# Patient Record
Sex: FEMALE | Race: WHITE | Employment: OTHER | URBAN - METROPOLITAN AREA
[De-identification: names, ages, dates, MRNs, and addresses within clinical notes are randomized per-mention and may not be internally consistent; named-entity substitution may affect disease eponyms.]

---

## 2022-03-19 PROBLEM — R53.82 CHRONIC FATIGUE: Status: ACTIVE | Noted: 2019-04-29

## 2022-03-19 PROBLEM — R06.02 SOB (SHORTNESS OF BREATH): Status: ACTIVE | Noted: 2019-03-19

## 2022-03-20 PROBLEM — J06.9 UPPER RESPIRATORY TRACT INFECTION: Status: ACTIVE | Noted: 2018-11-26

## 2022-03-24 PROBLEM — G43.109 MIGRAINE WITH VERTIGO: Status: ACTIVE | Noted: 2022-03-21

## 2022-04-18 LAB
AVERAGE GLUCOSE: NORMAL
HBA1C MFR BLD: 5.9 %

## 2022-08-19 LAB
AVERAGE GLUCOSE: NORMAL
HBA1C MFR BLD: 5.9 %

## 2022-09-26 ENCOUNTER — OFFICE VISIT (OUTPATIENT)
Dept: CARDIOLOGY CLINIC | Age: 77
End: 2022-09-26
Payer: COMMERCIAL

## 2022-09-26 VITALS
HEART RATE: 70 BPM | HEIGHT: 64 IN | DIASTOLIC BLOOD PRESSURE: 68 MMHG | SYSTOLIC BLOOD PRESSURE: 124 MMHG | BODY MASS INDEX: 31.92 KG/M2 | WEIGHT: 187 LBS

## 2022-09-26 DIAGNOSIS — I10 ESSENTIAL HYPERTENSION: ICD-10-CM

## 2022-09-26 DIAGNOSIS — I48.21 PERMANENT ATRIAL FIBRILLATION (HCC): Primary | ICD-10-CM

## 2022-09-26 PROCEDURE — 1123F ACP DISCUSS/DSCN MKR DOCD: CPT | Performed by: INTERNAL MEDICINE

## 2022-09-26 PROCEDURE — 99214 OFFICE O/P EST MOD 30 MIN: CPT | Performed by: INTERNAL MEDICINE

## 2022-09-26 RX ORDER — NEBIVOLOL 20 MG/1
20 TABLET ORAL DAILY
Qty: 30 TABLET | Refills: 12 | Status: SHIPPED | OUTPATIENT
Start: 2022-09-26 | End: 2022-09-27 | Stop reason: SDUPTHER

## 2022-09-26 RX ORDER — CETIRIZINE HYDROCHLORIDE 10 MG/1
10 TABLET ORAL DAILY
COMMUNITY

## 2022-09-26 RX ORDER — PHENOL 1.4 %
1 AEROSOL, SPRAY (ML) MUCOUS MEMBRANE DAILY
COMMUNITY

## 2022-09-26 RX ORDER — HYDROCHLOROTHIAZIDE 12.5 MG/1
12.5 TABLET ORAL DAILY
Qty: 30 TABLET | Refills: 12 | Status: SHIPPED | OUTPATIENT
Start: 2022-09-26 | End: 2022-09-27 | Stop reason: SDUPTHER

## 2022-09-26 ASSESSMENT — ENCOUNTER SYMPTOMS
DIARRHEA: 0
HOARSE VOICE: 0
HEMATOCHEZIA: 0
COLOR CHANGE: 0
WHEEZING: 0
BLURRED VISION: 0
SHORTNESS OF BREATH: 0
SPUTUM PRODUCTION: 0
HEMATEMESIS: 0
BOWEL INCONTINENCE: 0
ABDOMINAL PAIN: 0
ORTHOPNEA: 0

## 2022-09-26 NOTE — PROGRESS NOTES
[address], [address]  PHONE: [phone]        22        NAME:  Teodoro Espino  : 1945  MRN: [id number]       SUBJECTIVE:   Teodoro Espino is a 76 y.o. female seen for a follow up visit regarding the following: The patient has a hx of permanent AF and primary hypertension. She returns for scheduled follow up. Overall,she reports doing ok. Chief Complaint   Patient presents with    Atrial Fibrillation    Hypertension    Hyperlipidemia     6 month f/u       HPI:    Atrial Fibrillation  Presents for follow-up visit. Symptoms are negative for an AICD problem, bradycardia, chest pain, dizziness, hypertension, hypotension, pacemaker problem, palpitations, shortness of breath, syncope and weakness. Past Medical History, Past Surgical History, Family history, Social History, and Medications were all reviewed with the patient today and updated as necessary.          Current Outpatient Medications:     cetirizine (ZYRTEC) 10 MG tablet, Take 10 mg by mouth daily, Disp: , Rfl:     calcium carbonate 600 MG TABS tablet, Take 1 tablet by mouth daily, Disp: , Rfl:     hydroCHLOROthiazide (HYDRODIURIL) 12.5 MG tablet, Take 1 tablet by mouth daily, Disp: 30 tablet, Rfl: 12    nebivolol (BYSTOLIC) 20 MG TABS tablet, Take 1 tablet by mouth daily Take 1 tablet by mouth once daily, Disp: 30 tablet, Rfl: 12    rivaroxaban (XARELTO) 15 MG TABS tablet, Take 1 tablet by mouth daily, Disp: 30 tablet, Rfl: 12    acetaminophen (TYLENOL) 500 MG tablet, Take 500 mg by mouth every 6 hours as needed, Disp: , Rfl:     amLODIPine (NORVASC) 5 MG tablet, Take 5 mg by mouth daily, Disp: , Rfl:     atorvastatin (LIPITOR) 40 MG tablet, Take 40 mg by mouth daily, Disp: , Rfl:     clotrimazole-betamethasone (LOTRISONE) 1-0.05 % cream, Apply topically 2 times daily, Disp: , Rfl:     fluocinolone (DERMOTIC) 0.01 % OIL oil, Place in ear(s), Disp: , Rfl:     furosemide (LASIX) 40 MG tablet, Take 40 mg by mouth daily as needed, Disp: , Rfl:     levothyroxine (SYNTHROID) 100 MCG tablet, Take 88 mcg by mouth every morning (before breakfast), Disp: , Rfl:     nitroGLYCERIN (NITROSTAT) 0.4 MG SL tablet, Place 0.4 mg under the tongue, Disp: , Rfl:     omeprazole (PRILOSEC) 20 MG delayed release capsule, Take 20 mg by mouth daily, Disp: , Rfl:     polyethyl glycol-propyl glycol 0.4-0.3 % (SYSTANE) 0.4-0.3 % ophthalmic solution, as needed, Disp: , Rfl:     potassium chloride (KLOR-CON) 10 MEQ extended release tablet, Take 10 mEq by mouth daily, Disp: , Rfl:     hypromellose (ISOPTO TEARS) 0.5 % ophthalmic solution, Apply 1 drop to eye as needed (Patient not taking: Reported on 9/26/2022), Disp: , Rfl:   Allergies   Allergen Reactions    Sulfamethoxazole-Trimethoprim Other (See Comments)    Amoxicillin Rash     Past Medical History:   Diagnosis Date    Arthritis     Asthma     when exposed to smoke she wheezes    Chest pain     Disorder of thyroid 11/20/2015    Essential hypertension 11/20/2015    GERD (gastroesophageal reflux disease)     Hypercholesterolemia     Hyperlipidemia 11/20/2015    Hypothyroidism     Permanent atrial fibrillation ([facility] [address] 11/20/2015    Syncope      Past Surgical History:   Procedure Laterality Date    APPENDECTOMY      BLADDER SUSPENSION      bladder tack    CARDIAC CATHETERIZATION Left 04/15/2011    Normal coronary arteries. Normal LV function    HYSTERECTOMY (CERVIX STATUS UNKNOWN)      OTHER SURGICAL HISTORY      thyroid replacement    TOTAL KNEE ARTHROPLASTY Bilateral      Family History   Problem Relation Age of Onset    Coronary Art Dis Mother         Bypass surgery    Dementia Father     Other Mother         abdominal aortic aneurysms    Other Father         abdominal aortic aneurysms    Coronary Art Dis Father         bypass surgery twice      Social History     Tobacco Use    Smoking status: Never    Smokeless tobacco: Never   Substance Use Topics    Alcohol use:  No ROS:    Review of Systems   Constitutional: Negative for chills, decreased appetite, diaphoresis, fever and malaise/fatigue. HENT:  Negative for congestion, hearing loss, hoarse voice and nosebleeds. Eyes:  Negative for blurred vision. Cardiovascular:  Negative for chest pain, claudication, cyanosis, dyspnea on exertion, irregular heartbeat, leg swelling, near-syncope, orthopnea, palpitations, paroxysmal nocturnal dyspnea and syncope. Respiratory:  Negative for shortness of breath, sputum production and wheezing. Endocrine: Negative for polydipsia, polyphagia and polyuria. Skin:  Negative for color change. Gastrointestinal:  Negative for abdominal pain, bowel incontinence, diarrhea, hematemesis and hematochezia. Genitourinary:  Negative for dysuria, frequency and hematuria. Neurological:  Negative for dizziness, focal weakness, light-headedness, loss of balance, numbness, sensory change and weakness. Psychiatric/Behavioral:  Negative for altered mental status and memory loss. PHYSICAL EXAM:   /68   Pulse 70   Ht 5' 4\" (1.626 m)   Wt 187 lb (84.8 kg)   BMI 32.10 kg/m²      Physical Exam  Constitutional:       Appearance: Normal appearance. HENT:      Head: Normocephalic and atraumatic. Nose: Nose normal.   Eyes:      Extraocular Movements: Extraocular movements intact. Pupils: Pupils are equal, round, and reactive to light. Neck:      Vascular: No carotid bruit. Cardiovascular:      Rate and Rhythm: Rhythm irregular. Pulses: Normal pulses. Heart sounds: No murmur heard. Pulmonary:      Effort: Pulmonary effort is normal.      Breath sounds: Normal breath sounds. Abdominal:      General: Abdomen is flat. Bowel sounds are normal.      Palpations: Abdomen is soft. Musculoskeletal:         General: Normal range of motion. Cervical back: Normal range of motion and neck supple. Skin:     General: Skin is warm and dry.    Neurological: General: No focal deficit present. Mental Status: She is alert and oriented to person, place, and time. Psychiatric:         Mood and Affect: Mood normal.       Medical problems and test results were reviewed with the patient today. ASSESSMENT and PLAN    Yuki Del Cid was seen today for atrial fibrillation, hypertension and hyperlipidemia. Diagnoses and all orders for this visit:    Permanent atrial fibrillation (HCC):Stable. Continue Bystolic and Xarelto. .  -     rivaroxaban (XARELTO) 15 MG TABS tablet; Take 1 tablet by mouth daily    Essential hypertension:Stable. Continue Bystolic ,Norvasc,and HCTZ. -     hydroCHLOROthiazide (HYDRODIURIL) 12.5 MG tablet; Take 1 tablet by mouth daily  -     nebivolol (BYSTOLIC) 20 MG TABS tablet; Take 1 tablet by mouth daily Take 1 tablet by mouth once daily        Disposition:    Return in about 6 months (around 3/26/2023).                 Kendra Hawley MD  9/26/2022  1:15 PM

## 2022-09-27 DIAGNOSIS — I10 ESSENTIAL HYPERTENSION: ICD-10-CM

## 2022-09-27 DIAGNOSIS — I48.21 PERMANENT ATRIAL FIBRILLATION (HCC): ICD-10-CM

## 2022-09-27 RX ORDER — NEBIVOLOL 20 MG/1
20 TABLET ORAL DAILY
Qty: 90 TABLET | Refills: 3 | Status: SHIPPED | OUTPATIENT
Start: 2022-09-27

## 2022-09-27 RX ORDER — HYDROCHLOROTHIAZIDE 12.5 MG/1
12.5 TABLET ORAL DAILY
Qty: 90 TABLET | Refills: 3 | Status: SHIPPED | OUTPATIENT
Start: 2022-09-27

## 2022-09-27 NOTE — TELEPHONE ENCOUNTER
Patient states that she was seen in the office yesterday and told the nurse that she needed refills but NOTHING was called in and now she is completley out!!! Patient was upset that these were not called in. She needs amlodipine , Xarelto and Hydrochlorothiazide . Please call these in today to [facility] in [facility]. Patient also wants nurse to call her when called in.

## 2022-09-27 NOTE — TELEPHONE ENCOUNTER
Returned patient's call, informing her that her prescriptions were printed yesterday instead of being sent electronically. Informed that I have pended the RXs & sent them to Dr. Bethanne Cabot for approval. She verbalized understanding & thanked.  //pg

## 2022-10-04 NOTE — TELEPHONE ENCOUNTER
Pt states that she did not get the refill on Amlodipine 5 mg that was supposed to be sent to [address] [facility] in [facility]. She states that she only has one more pill left. Please call pt with any questions. Thank you.

## 2022-10-07 RX ORDER — AMLODIPINE BESYLATE 5 MG/1
5 TABLET ORAL DAILY
Qty: 90 TABLET | Refills: 3 | Status: SHIPPED | OUTPATIENT
Start: 2022-10-07

## 2022-10-07 RX ORDER — AMLODIPINE BESYLATE 5 MG/1
TABLET ORAL
Qty: 90 TABLET | Refills: 3 | Status: SHIPPED | OUTPATIENT
Start: 2022-10-07

## 2023-03-24 ENCOUNTER — OFFICE VISIT (OUTPATIENT)
Dept: CARDIOLOGY CLINIC | Age: 78
End: 2023-03-24

## 2023-03-24 VITALS
SYSTOLIC BLOOD PRESSURE: 125 MMHG | HEART RATE: 61 BPM | BODY MASS INDEX: 31.82 KG/M2 | WEIGHT: 186.4 LBS | DIASTOLIC BLOOD PRESSURE: 74 MMHG | HEIGHT: 64 IN

## 2023-03-24 DIAGNOSIS — I10 ESSENTIAL HYPERTENSION: ICD-10-CM

## 2023-03-24 DIAGNOSIS — I48.21 PERMANENT ATRIAL FIBRILLATION (HCC): Primary | ICD-10-CM

## 2023-03-24 RX ORDER — NEBIVOLOL 20 MG/1
20 TABLET ORAL DAILY
Qty: 90 TABLET | Refills: 3 | Status: SHIPPED | OUTPATIENT
Start: 2023-03-24

## 2023-03-24 RX ORDER — MECLIZINE HCL 12.5 MG/1
TABLET ORAL
COMMUNITY

## 2023-03-24 ASSESSMENT — ENCOUNTER SYMPTOMS
SHORTNESS OF BREATH: 0
HOARSE VOICE: 0
DIARRHEA: 0
ABDOMINAL PAIN: 0
BOWEL INCONTINENCE: 0
WHEEZING: 0
HEMATOCHEZIA: 0
BLURRED VISION: 0
HEMATEMESIS: 0
SPUTUM PRODUCTION: 0
COLOR CHANGE: 0
ORTHOPNEA: 0

## 2023-03-24 NOTE — PROGRESS NOTES
Place in ear(s), Disp: , Rfl:     furosemide (LASIX) 40 MG tablet, Take 40 mg by mouth daily as needed, Disp: , Rfl:     hypromellose (ISOPTO TEARS) 0.5 % ophthalmic solution, Apply 1 drop to eye as needed, Disp: , Rfl:     levothyroxine (SYNTHROID) 100 MCG tablet, Take 88 mcg by mouth every morning (before breakfast), Disp: , Rfl:     nitroGLYCERIN (NITROSTAT) 0.4 MG SL tablet, Place 0.4 mg under the tongue, Disp: , Rfl:     omeprazole (PRILOSEC) 20 MG delayed release capsule, Take 20 mg by mouth daily, Disp: , Rfl:     polyethyl glycol-propyl glycol 0.4-0.3 % (SYSTANE) 0.4-0.3 % ophthalmic solution, as needed, Disp: , Rfl:     potassium chloride (KLOR-CON) 10 MEQ extended release tablet, Take 10 mEq by mouth daily, Disp: , Rfl:   Allergies   Allergen Reactions    Sulfamethoxazole-Trimethoprim Other (See Comments)    Amoxicillin Rash     Past Medical History:   Diagnosis Date    Arthritis     Asthma     when exposed to smoke she wheezes    Chest pain     Disorder of thyroid 11/20/2015    Essential hypertension 11/20/2015    GERD (gastroesophageal reflux disease)     Hypercholesterolemia     Hyperlipidemia 11/20/2015    Hypothyroidism     Permanent atrial fibrillation ([facility] [address] 11/20/2015    Syncope      Past Surgical History:   Procedure Laterality Date    APPENDECTOMY      BLADDER SUSPENSION      bladder tack    CARDIAC CATHETERIZATION Left 04/15/2011    Normal coronary arteries. Normal LV function    HYSTERECTOMY (CERVIX STATUS UNKNOWN)      OTHER SURGICAL HISTORY      thyroid replacement    TOTAL KNEE ARTHROPLASTY Bilateral      Family History   Problem Relation Age of Onset    Coronary Art Dis Mother         Bypass surgery    Dementia Father     Other Mother         abdominal aortic aneurysms    Other Father         abdominal aortic aneurysms    Coronary Art Dis Father         bypass surgery twice      Social History     Tobacco Use    Smoking status: Never    Smokeless tobacco: Never   Substance Use Topics

## 2023-09-08 ENCOUNTER — OFFICE VISIT (OUTPATIENT)
Age: 78
End: 2023-09-08
Payer: MEDICARE

## 2023-09-08 VITALS
SYSTOLIC BLOOD PRESSURE: 138 MMHG | HEIGHT: 64 IN | HEART RATE: 62 BPM | DIASTOLIC BLOOD PRESSURE: 68 MMHG | BODY MASS INDEX: 31.07 KG/M2 | WEIGHT: 182 LBS

## 2023-09-08 DIAGNOSIS — I10 ESSENTIAL HYPERTENSION: ICD-10-CM

## 2023-09-08 DIAGNOSIS — I48.21 PERMANENT ATRIAL FIBRILLATION (HCC): ICD-10-CM

## 2023-09-08 DIAGNOSIS — R42 DIZZINESS: ICD-10-CM

## 2023-09-08 PROCEDURE — 1036F TOBACCO NON-USER: CPT | Performed by: INTERNAL MEDICINE

## 2023-09-08 PROCEDURE — 99214 OFFICE O/P EST MOD 30 MIN: CPT | Performed by: INTERNAL MEDICINE

## 2023-09-08 PROCEDURE — 3075F SYST BP GE 130 - 139MM HG: CPT | Performed by: INTERNAL MEDICINE

## 2023-09-08 PROCEDURE — G8427 DOCREV CUR MEDS BY ELIG CLIN: HCPCS | Performed by: INTERNAL MEDICINE

## 2023-09-08 PROCEDURE — 1090F PRES/ABSN URINE INCON ASSESS: CPT | Performed by: INTERNAL MEDICINE

## 2023-09-08 PROCEDURE — G8417 CALC BMI ABV UP PARAM F/U: HCPCS | Performed by: INTERNAL MEDICINE

## 2023-09-08 PROCEDURE — 1123F ACP DISCUSS/DSCN MKR DOCD: CPT | Performed by: INTERNAL MEDICINE

## 2023-09-08 PROCEDURE — 3078F DIAST BP <80 MM HG: CPT | Performed by: INTERNAL MEDICINE

## 2023-09-08 PROCEDURE — G8400 PT W/DXA NO RESULTS DOC: HCPCS | Performed by: INTERNAL MEDICINE

## 2023-09-08 RX ORDER — AMLODIPINE BESYLATE 5 MG/1
5 TABLET ORAL DAILY
Qty: 90 TABLET | Refills: 3 | Status: SHIPPED | OUTPATIENT
Start: 2023-09-08

## 2023-09-08 RX ORDER — NEBIVOLOL 20 MG/1
20 TABLET ORAL DAILY
Qty: 90 TABLET | Refills: 3 | Status: SHIPPED | OUTPATIENT
Start: 2023-09-08

## 2023-09-08 RX ORDER — HYDROCHLOROTHIAZIDE 12.5 MG/1
12.5 TABLET ORAL DAILY
Qty: 90 TABLET | Refills: 3 | Status: SHIPPED | OUTPATIENT
Start: 2023-09-08 | End: 2023-09-08 | Stop reason: ALTCHOICE

## 2023-09-08 ASSESSMENT — ENCOUNTER SYMPTOMS
SWOLLEN GLANDS: 0
NAUSEA: 0
VOMITING: 0
ABDOMINAL PAIN: 0
SORE THROAT: 0
BOWEL INCONTINENCE: 0
HOARSE VOICE: 0
ORTHOPNEA: 0
COLOR CHANGE: 0
SPUTUM PRODUCTION: 0
HEMATOCHEZIA: 0
COUGH: 0
CHANGE IN BOWEL HABIT: 0
WHEEZING: 0
BLURRED VISION: 0
VISUAL CHANGE: 0
DIARRHEA: 0
SHORTNESS OF BREATH: 0
HEMATEMESIS: 0

## 2023-09-08 NOTE — PROGRESS NOTES
[address]  PHONE: [phone]        23        NAME:  Seth Duran  : 1945  MRN: [id number]       SUBJECTIVE:   Seth Duran is a 78 y.o. female seen for a follow up visit regarding the following: The patient has primary hypertension and permanent AF. She returns for scheduled follow up. She reports worsening brief episodes of dizziness. Dizziness usually occurs upon standing and resolves with standing still or sitting down. Episodes rarely occur sitting. She reports no LOC. Chief Complaint   Patient presents with    Atrial Fibrillation       HPI:    Dizziness  This is a new problem. The problem occurs every several days. The problem has been gradually worsening. Associated symptoms include fatigue. Pertinent negatives include no abdominal pain, anorexia, arthralgias, change in bowel habit, chest pain, chills, congestion, coughing, diaphoresis, fever, headaches, joint swelling, myalgias, nausea, neck pain, numbness, rash, sore throat, swollen glands, urinary symptoms, vertigo, visual change, vomiting or weakness. The symptoms are aggravated by standing. She has tried nothing for the symptoms. Past Medical History, Past Surgical History, Family history, Social History, and Medications were all reviewed with the patient today and updated as necessary.          Current Outpatient Medications:     rivaroxaban (XARELTO) 15 MG TABS tablet, Take 1 tablet by mouth daily, Disp: 90 tablet, Rfl: 3    nebivolol (BYSTOLIC) 20 MG TABS tablet, Take 1 tablet by mouth daily Take 1 tablet by mouth once daily, Disp: 90 tablet, Rfl: 3    hydroCHLOROthiazide (HYDRODIURIL) 12.5 MG tablet, Take 1 tablet by mouth daily, Disp: 90 tablet, Rfl: 3    amLODIPine (NORVASC) 5 MG tablet, Take 1 tablet by mouth daily, Disp: 90 tablet, Rfl: 3    meclizine (ANTIVERT) 12.5 MG tablet, by Oral/Gastric Tube route, Disp: , Rfl:     calcium carbonate 600 MG TABS tablet,

## 2023-10-03 ENCOUNTER — TELEPHONE (OUTPATIENT)
Age: 78
End: 2023-10-03

## 2023-10-03 NOTE — TELEPHONE ENCOUNTER
Received call from Norma at [facility]. Ms. Jamshid Gilmore is wearing a monitor and the monitor showed A-Fib burden of 100% with a slow ventricular rate of 38. This was recorded on 9/20/2023 at 05:51 (AM). Report is received and filed in her chart. Review of records shows Ms. Jamshid Gilmore has a history of permanent atrial fibrillation./janette

## 2023-10-11 ENCOUNTER — OFFICE VISIT (OUTPATIENT)
Age: 78
End: 2023-10-11
Payer: MEDICARE

## 2023-10-11 VITALS
HEIGHT: 64 IN | SYSTOLIC BLOOD PRESSURE: 118 MMHG | HEART RATE: 66 BPM | BODY MASS INDEX: 31.76 KG/M2 | DIASTOLIC BLOOD PRESSURE: 62 MMHG | WEIGHT: 186 LBS

## 2023-10-11 DIAGNOSIS — I10 ESSENTIAL HYPERTENSION: ICD-10-CM

## 2023-10-11 DIAGNOSIS — I48.21 PERMANENT ATRIAL FIBRILLATION (HCC): Primary | ICD-10-CM

## 2023-10-11 PROCEDURE — 99214 OFFICE O/P EST MOD 30 MIN: CPT | Performed by: INTERNAL MEDICINE

## 2023-10-11 PROCEDURE — G8417 CALC BMI ABV UP PARAM F/U: HCPCS | Performed by: INTERNAL MEDICINE

## 2023-10-11 PROCEDURE — 1123F ACP DISCUSS/DSCN MKR DOCD: CPT | Performed by: INTERNAL MEDICINE

## 2023-10-11 PROCEDURE — 1090F PRES/ABSN URINE INCON ASSESS: CPT | Performed by: INTERNAL MEDICINE

## 2023-10-11 PROCEDURE — 3074F SYST BP LT 130 MM HG: CPT | Performed by: INTERNAL MEDICINE

## 2023-10-11 PROCEDURE — G8427 DOCREV CUR MEDS BY ELIG CLIN: HCPCS | Performed by: INTERNAL MEDICINE

## 2023-10-11 PROCEDURE — G8484 FLU IMMUNIZE NO ADMIN: HCPCS | Performed by: INTERNAL MEDICINE

## 2023-10-11 PROCEDURE — G8400 PT W/DXA NO RESULTS DOC: HCPCS | Performed by: INTERNAL MEDICINE

## 2023-10-11 PROCEDURE — 1036F TOBACCO NON-USER: CPT | Performed by: INTERNAL MEDICINE

## 2023-10-11 PROCEDURE — 3078F DIAST BP <80 MM HG: CPT | Performed by: INTERNAL MEDICINE

## 2023-10-11 RX ORDER — AMLODIPINE BESYLATE 10 MG/1
10 TABLET ORAL DAILY
Qty: 90 TABLET | Refills: 3 | Status: SHIPPED | OUTPATIENT
Start: 2023-10-11

## 2023-10-11 ASSESSMENT — ENCOUNTER SYMPTOMS
COLOR CHANGE: 0
SPUTUM PRODUCTION: 0
WHEEZING: 0
HEMATEMESIS: 0
DIARRHEA: 0
SHORTNESS OF BREATH: 1
HOARSE VOICE: 0
ORTHOPNEA: 0
BLURRED VISION: 0
ABDOMINAL PAIN: 0
HEMATOCHEZIA: 0
BOWEL INCONTINENCE: 0

## 2023-10-11 NOTE — PROGRESS NOTES
Disp: , Rfl:     levothyroxine (SYNTHROID) 100 MCG tablet, Take 88 mcg by mouth every morning (before breakfast), Disp: , Rfl:     nitroGLYCERIN (NITROSTAT) 0.4 MG SL tablet, Place 1 tablet under the tongue, Disp: , Rfl:     omeprazole (PRILOSEC) 20 MG delayed release capsule, Take 1 capsule by mouth daily, Disp: , Rfl:     polyethyl glycol-propyl glycol 0.4-0.3 % (SYSTANE) 0.4-0.3 % ophthalmic solution, as needed, Disp: , Rfl:     potassium chloride (KLOR-CON) 10 MEQ extended release tablet, Take 1 tablet by mouth daily, Disp: , Rfl:     meclizine (ANTIVERT) 12.5 MG tablet, by Oral/Gastric Tube route (Patient not taking: Reported on 9/8/2023), Disp: , Rfl:     cetirizine (ZYRTEC) 10 MG tablet, Take 10 mg by mouth daily (Patient not taking: Reported on 9/8/2023), Disp: , Rfl:     clotrimazole-betamethasone (LOTRISONE) 1-0.05 % cream, Apply topically 2 times daily (Patient not taking: Reported on 10/11/2023), Disp: , Rfl:   Allergies   Allergen Reactions    Sulfamethoxazole-Trimethoprim Other (See Comments)    Amoxicillin Rash     Past Medical History:   Diagnosis Date    Arthritis     Asthma     when exposed to smoke she wheezes    Chest pain     Disorder of thyroid 11/20/2015    Essential hypertension 11/20/2015    GERD (gastroesophageal reflux disease)     Hypercholesterolemia     Hyperlipidemia 11/20/2015    Hypothyroidism     Permanent atrial fibrillation ([address]) 11/20/2015    Syncope      Past Surgical History:   Procedure Laterality Date    APPENDECTOMY      BLADDER SUSPENSION      bladder tack    CARDIAC CATHETERIZATION Left 04/15/2011    Normal coronary arteries. Normal LV function    HYSTERECTOMY (CERVIX STATUS UNKNOWN)      OTHER SURGICAL HISTORY      thyroid replacement    TOTAL KNEE ARTHROPLASTY Bilateral      Family History   Problem Relation Age of Onset    Coronary Art Dis Mother         Bypass surgery    Dementia Father     Other Mother         abdominal aortic aneurysms    Other Father

## 2023-10-30 ENCOUNTER — TELEPHONE (OUTPATIENT)
Age: 78
End: 2023-10-30

## 2023-10-30 RX ORDER — LOSARTAN POTASSIUM 100 MG/1
100 TABLET ORAL DAILY
Qty: 30 TABLET | Refills: 11 | Status: SHIPPED | OUTPATIENT
Start: 2023-10-30

## 2023-10-30 NOTE — TELEPHONE ENCOUNTER
Pt's daughter Hyacinth Gabriel called on behalf of the pt to report that both of the pt's legs are swollen with red blotches. States the swelling has been going on for about a week. Denies CP and SOB.

## 2023-10-30 NOTE — TELEPHONE ENCOUNTER
Pt c/o worsening edema in bilateral legs up to her calves which started 10/23/23. Pt started on amlodipine 10mg daily 10/11/23. Denies warmth in legs where red splotches are. BP is 140/75 and HR 59    Triage informed Dr. Mary Ann Rodas. Per Dr. Mary Ann Rodas, stop amlodipine and start losartan 100mg daily. Triage informed Marie Esteves of Dr. Kit Lu response. She verbalizes understanding and agrees to plan. Triage Eprescribed losartan to [facility] DR HYACINTH KELLER.

## 2023-11-17 ENCOUNTER — OFFICE VISIT (OUTPATIENT)
Age: 78
End: 2023-11-17
Payer: MEDICARE

## 2023-11-17 VITALS
SYSTOLIC BLOOD PRESSURE: 122 MMHG | HEIGHT: 64 IN | HEART RATE: 80 BPM | WEIGHT: 182 LBS | DIASTOLIC BLOOD PRESSURE: 76 MMHG | BODY MASS INDEX: 31.07 KG/M2

## 2023-11-17 DIAGNOSIS — I10 ESSENTIAL HYPERTENSION: ICD-10-CM

## 2023-11-17 DIAGNOSIS — I48.21 PERMANENT ATRIAL FIBRILLATION (HCC): Primary | ICD-10-CM

## 2023-11-17 PROCEDURE — 1036F TOBACCO NON-USER: CPT | Performed by: INTERNAL MEDICINE

## 2023-11-17 PROCEDURE — G8417 CALC BMI ABV UP PARAM F/U: HCPCS | Performed by: INTERNAL MEDICINE

## 2023-11-17 PROCEDURE — 3078F DIAST BP <80 MM HG: CPT | Performed by: INTERNAL MEDICINE

## 2023-11-17 PROCEDURE — 3074F SYST BP LT 130 MM HG: CPT | Performed by: INTERNAL MEDICINE

## 2023-11-17 PROCEDURE — 1123F ACP DISCUSS/DSCN MKR DOCD: CPT | Performed by: INTERNAL MEDICINE

## 2023-11-17 PROCEDURE — 1090F PRES/ABSN URINE INCON ASSESS: CPT | Performed by: INTERNAL MEDICINE

## 2023-11-17 PROCEDURE — G8484 FLU IMMUNIZE NO ADMIN: HCPCS | Performed by: INTERNAL MEDICINE

## 2023-11-17 PROCEDURE — 99214 OFFICE O/P EST MOD 30 MIN: CPT | Performed by: INTERNAL MEDICINE

## 2023-11-17 PROCEDURE — G8400 PT W/DXA NO RESULTS DOC: HCPCS | Performed by: INTERNAL MEDICINE

## 2023-11-17 PROCEDURE — G8427 DOCREV CUR MEDS BY ELIG CLIN: HCPCS | Performed by: INTERNAL MEDICINE

## 2023-11-17 ASSESSMENT — ENCOUNTER SYMPTOMS
BOWEL INCONTINENCE: 0
COLOR CHANGE: 0
HEMATOCHEZIA: 0
HOARSE VOICE: 0
BLURRED VISION: 0
DIARRHEA: 0
ABDOMINAL PAIN: 0
SPUTUM PRODUCTION: 0
SHORTNESS OF BREATH: 0
ORTHOPNEA: 0
HEMATEMESIS: 0
WHEEZING: 0

## 2023-11-17 NOTE — PROGRESS NOTES
sounds. Abdominal:      General: Abdomen is flat. Bowel sounds are normal.      Palpations: Abdomen is soft. Musculoskeletal:         General: Normal range of motion. Cervical back: Normal range of motion and neck supple. Skin:     General: Skin is warm and dry. Neurological:      General: No focal deficit present. Mental Status: She is alert and oriented to person, place, and time. Psychiatric:         Mood and Affect: Mood normal.         Medical problems and test results were reviewed with the patient today. Recent Results (from the past 672 hour(s))   Extended cardiac holter monitor (48 hrs - 15 days)    Collection Time: 11/17/23  4:34 PM   Result Value Ref Range    Body Surface Area 1.93 m2     No results found for: \"CHOL\", \"CHOLPOCT\", \"CHOLX\", \"CHLST\", \"CHOLV\", \"HDL\", \"HDLPOC\", \"HDLC\", \"LDL\", \"LDLC\", \"VLDLC\", \"VLDL\", \"TGLX\", \"TRIGL\"  Results for orders placed or performed in visit on 11/17/23   Extended cardiac holter monitor (48 hrs - 15 days)   Result Value Ref Range    Body Surface Area 1.93 m2       ASSESSMENT and Yovani Gilmore was seen today for atrial fibrillation. Diagnoses and all orders for this visit:    Permanent atrial fibrillation (HCC):AF persists. Continue Xarelto for [facility] and stroke risk reduction. Repeat holter off Beta blocker to re assess pacemaker indication.  -     Extended cardiac holter monitor (48 hrs - 15 days); Future    Essential hypertension:Stable and at goal.Continue Losartan and home BP and Hr monitoring ( ariel. With associated dizziness). Disposition:    Return for Follow up after procedure.                 Roberta Pompa MD  11/17/2023  5:38 PM

## 2024-01-05 ENCOUNTER — OFFICE VISIT (OUTPATIENT)
Age: 79
End: 2024-01-05

## 2024-01-05 VITALS
BODY MASS INDEX: 30.39 KG/M2 | WEIGHT: 178 LBS | HEART RATE: 60 BPM | SYSTOLIC BLOOD PRESSURE: 120 MMHG | DIASTOLIC BLOOD PRESSURE: 66 MMHG | HEIGHT: 64 IN

## 2024-01-05 DIAGNOSIS — I49.3 PVC (PREMATURE VENTRICULAR CONTRACTION): ICD-10-CM

## 2024-01-05 DIAGNOSIS — I48.21 PERMANENT ATRIAL FIBRILLATION (HCC): Primary | ICD-10-CM

## 2024-01-05 DIAGNOSIS — I10 ESSENTIAL HYPERTENSION: ICD-10-CM

## 2024-01-05 ASSESSMENT — ENCOUNTER SYMPTOMS
HEMATOCHEZIA: 0
HOARSE VOICE: 0
ABDOMINAL PAIN: 0
SPUTUM PRODUCTION: 0
SHORTNESS OF BREATH: 0
BLURRED VISION: 0
ORTHOPNEA: 0
BOWEL INCONTINENCE: 0
HEMATEMESIS: 0
DIARRHEA: 0
COLOR CHANGE: 0
WHEEZING: 0

## 2024-01-05 NOTE — PROGRESS NOTES
[facility] CARDIOLOGY  [address] [place] [address], [address]  [place] [address]  PHONE: [phone]        24        NAME:  Josefina Young  : 1945  MRN: [id number]       SUBJECTIVE:   Josefina Young is a 78 y.o. female seen for a follow up visit regarding the following: The patient has permanent AF and primary hypertension.Recent extended holter monitor reported 81 episodes of pauses up to 4.6 seconds,slow AF with HR dropping to 32 ,and persistent AF with average HR of 52.Bystolic was stopped and repeat extended holter was performed.Repeat holter monitor revealed AF with average HR of 72 and only 3 pauses ranging between 3-3.4 seconds and occasional PVC's.She returns to discuss monitor results.I discussed monitor results with the patient & daughter.She reports feeling ok.    Chief Complaint   Patient presents with    Atrial Fibrillation    Results     monitor       HPI:    Atrial Fibrillation  Presents for follow-up visit. Symptoms are negative for an AICD problem, bradycardia, chest pain, dizziness, hemodynamic instability, hypertension, hypotension, pacemaker problem, palpitations, shortness of breath, syncope, tachycardia and weakness. The symptoms have been improving.       Past Medical History, Past Surgical History, Family history, Social History, and Medications were all reviewed with the patient today and updated as necessary.         Current Outpatient Medications:     losartan (COZAAR) 100 MG tablet, Take 1 tablet by mouth daily, Disp: 30 tablet, Rfl: 11    rivaroxaban (XARELTO) 15 MG TABS tablet, Take 1 tablet by mouth daily, Disp: 90 tablet, Rfl: 3    calcium carbonate 600 MG TABS tablet, Take 1 tablet by mouth daily, Disp: , Rfl:     acetaminophen (TYLENOL) 500 MG tablet, Take 1 tablet by mouth every 6 hours as needed, Disp: , Rfl:     atorvastatin (LIPITOR) 40 MG tablet, Take 1 tablet by mouth daily, Disp: , Rfl:     fluocinolone (DERMOTIC) 0.01 % OIL oil, Place in ear(s), Disp: , Rfl:

## 2024-07-11 ENCOUNTER — OFFICE VISIT (OUTPATIENT)
Age: 79
End: 2024-07-11
Payer: MEDICARE

## 2024-07-11 VITALS
HEIGHT: 64 IN | SYSTOLIC BLOOD PRESSURE: 110 MMHG | HEART RATE: 55 BPM | WEIGHT: 173.8 LBS | BODY MASS INDEX: 29.67 KG/M2 | DIASTOLIC BLOOD PRESSURE: 68 MMHG

## 2024-07-11 DIAGNOSIS — I10 ESSENTIAL HYPERTENSION: ICD-10-CM

## 2024-07-11 DIAGNOSIS — I48.21 PERMANENT ATRIAL FIBRILLATION (HCC): Primary | ICD-10-CM

## 2024-07-11 PROCEDURE — 3074F SYST BP LT 130 MM HG: CPT | Performed by: INTERNAL MEDICINE

## 2024-07-11 PROCEDURE — G8417 CALC BMI ABV UP PARAM F/U: HCPCS | Performed by: INTERNAL MEDICINE

## 2024-07-11 PROCEDURE — 3078F DIAST BP <80 MM HG: CPT | Performed by: INTERNAL MEDICINE

## 2024-07-11 PROCEDURE — 1090F PRES/ABSN URINE INCON ASSESS: CPT | Performed by: INTERNAL MEDICINE

## 2024-07-11 PROCEDURE — 99214 OFFICE O/P EST MOD 30 MIN: CPT | Performed by: INTERNAL MEDICINE

## 2024-07-11 PROCEDURE — G8400 PT W/DXA NO RESULTS DOC: HCPCS | Performed by: INTERNAL MEDICINE

## 2024-07-11 PROCEDURE — 1123F ACP DISCUSS/DSCN MKR DOCD: CPT | Performed by: INTERNAL MEDICINE

## 2024-07-11 PROCEDURE — 93000 ELECTROCARDIOGRAM COMPLETE: CPT | Performed by: INTERNAL MEDICINE

## 2024-07-11 PROCEDURE — 1036F TOBACCO NON-USER: CPT | Performed by: INTERNAL MEDICINE

## 2024-07-11 PROCEDURE — G8427 DOCREV CUR MEDS BY ELIG CLIN: HCPCS | Performed by: INTERNAL MEDICINE

## 2024-07-11 RX ORDER — LOSARTAN POTASSIUM 100 MG/1
100 TABLET ORAL DAILY
Qty: 90 TABLET | Refills: 3 | Status: SHIPPED | OUTPATIENT
Start: 2024-07-11

## 2024-07-11 RX ORDER — DONEPEZIL HYDROCHLORIDE 10 MG/1
10 TABLET, FILM COATED ORAL NIGHTLY
COMMUNITY

## 2024-07-11 ASSESSMENT — ENCOUNTER SYMPTOMS
ORTHOPNEA: 0
BOWEL INCONTINENCE: 0
BLURRED VISION: 0
ABDOMINAL PAIN: 1
HOARSE VOICE: 0
COLOR CHANGE: 0
WHEEZING: 0
DIARRHEA: 0
HEMATEMESIS: 0
SPUTUM PRODUCTION: 0
SHORTNESS OF BREATH: 0
HEMATOCHEZIA: 0

## 2024-07-11 NOTE — PROGRESS NOTES
Negative for blurred vision.   Cardiovascular:  Negative for chest pain, claudication, cyanosis, dyspnea on exertion, irregular heartbeat, leg swelling, near-syncope, orthopnea, palpitations, paroxysmal nocturnal dyspnea and syncope.   Respiratory:  Negative for shortness of breath, sputum production and wheezing.    Endocrine: Negative for polydipsia, polyphagia and polyuria.   Skin:  Negative for color change.   Gastrointestinal:  Positive for abdominal pain. Negative for bowel incontinence, diarrhea, hematemesis and hematochezia.   Genitourinary:  Negative for dysuria, frequency and hematuria.   Neurological:  Positive for dizziness. Negative for focal weakness, light-headedness, loss of balance, numbness, sensory change and weakness.   Psychiatric/Behavioral:  Negative for altered mental status and memory loss.            PHYSICAL EXAM:   /68   Pulse 55   Ht 1.626 m (5' 4\")   Wt 78.8 kg (173 lb 12.8 oz)   BMI 29.83 kg/m²      Physical Exam  Constitutional:       Appearance: Normal appearance.   HENT:      Head: Normocephalic and atraumatic.      Nose: Nose normal.   Eyes:      Extraocular Movements: Extraocular movements intact.      Pupils: Pupils are equal, round, and reactive to light.   Neck:      Vascular: No carotid bruit.   Cardiovascular:      Rate and Rhythm: Rhythm irregular.      Pulses: Normal pulses.      Heart sounds: No murmur heard.  Pulmonary:      Effort: Pulmonary effort is normal.      Breath sounds: Normal breath sounds.   Abdominal:      General: Abdomen is flat. Bowel sounds are normal.      Palpations: Abdomen is soft.   Musculoskeletal:         General: Normal range of motion.      Cervical back: Normal range of motion and neck supple.   Skin:     General: Skin is warm and dry.   Neurological:      General: No focal deficit present.      Mental Status: She is alert and oriented to person, place, and time.   Psychiatric:         Mood and Affect: Mood normal.         Medical

## 2025-01-15 ENCOUNTER — OFFICE VISIT (OUTPATIENT)
Age: 80
End: 2025-01-15
Payer: MEDICARE

## 2025-01-15 VITALS
DIASTOLIC BLOOD PRESSURE: 80 MMHG | HEIGHT: 63 IN | WEIGHT: 175 LBS | HEART RATE: 56 BPM | SYSTOLIC BLOOD PRESSURE: 108 MMHG | BODY MASS INDEX: 31.01 KG/M2

## 2025-01-15 DIAGNOSIS — I48.21 PERMANENT ATRIAL FIBRILLATION (HCC): ICD-10-CM

## 2025-01-15 DIAGNOSIS — I10 ESSENTIAL HYPERTENSION: ICD-10-CM

## 2025-01-15 PROCEDURE — 1160F RVW MEDS BY RX/DR IN RCRD: CPT | Performed by: INTERNAL MEDICINE

## 2025-01-15 PROCEDURE — 1090F PRES/ABSN URINE INCON ASSESS: CPT | Performed by: INTERNAL MEDICINE

## 2025-01-15 PROCEDURE — 99214 OFFICE O/P EST MOD 30 MIN: CPT | Performed by: INTERNAL MEDICINE

## 2025-01-15 PROCEDURE — 1126F AMNT PAIN NOTED NONE PRSNT: CPT | Performed by: INTERNAL MEDICINE

## 2025-01-15 PROCEDURE — G8400 PT W/DXA NO RESULTS DOC: HCPCS | Performed by: INTERNAL MEDICINE

## 2025-01-15 PROCEDURE — G8427 DOCREV CUR MEDS BY ELIG CLIN: HCPCS | Performed by: INTERNAL MEDICINE

## 2025-01-15 PROCEDURE — G8417 CALC BMI ABV UP PARAM F/U: HCPCS | Performed by: INTERNAL MEDICINE

## 2025-01-15 PROCEDURE — 3079F DIAST BP 80-89 MM HG: CPT | Performed by: INTERNAL MEDICINE

## 2025-01-15 PROCEDURE — 3074F SYST BP LT 130 MM HG: CPT | Performed by: INTERNAL MEDICINE

## 2025-01-15 PROCEDURE — 1123F ACP DISCUSS/DSCN MKR DOCD: CPT | Performed by: INTERNAL MEDICINE

## 2025-01-15 PROCEDURE — 1036F TOBACCO NON-USER: CPT | Performed by: INTERNAL MEDICINE

## 2025-01-15 PROCEDURE — 1159F MED LIST DOCD IN RCRD: CPT | Performed by: INTERNAL MEDICINE

## 2025-01-15 RX ORDER — LOSARTAN POTASSIUM 100 MG/1
100 TABLET ORAL DAILY
Qty: 90 TABLET | Refills: 3 | Status: SHIPPED | OUTPATIENT
Start: 2025-01-15

## 2025-01-15 ASSESSMENT — ENCOUNTER SYMPTOMS
ABDOMINAL PAIN: 0
BLURRED VISION: 0
WHEEZING: 0
COLOR CHANGE: 0
HEMATEMESIS: 0
HEMATOCHEZIA: 0
SHORTNESS OF BREATH: 0
DIARRHEA: 0
ORTHOPNEA: 0
BOWEL INCONTINENCE: 0
SPUTUM PRODUCTION: 0
HOARSE VOICE: 0

## 2025-01-15 NOTE — PROGRESS NOTES
visit (from the past 672 hour(s)).  No results found for: \"CHOL\", \"CHOLPOCT\", \"CHLST\", \"CHOLV\", \"HDL\", \"HDLPOC\", \"HDLC\", \"LDL\", \"LDLC\", \"VLDLC\", \"VLDL\"  No results found for any visits on 01/15/25.    ASSESSMENT and PLAN    Josefina was seen today for atrial fibrillation.    Diagnoses and all orders for this visit:    Essential hypertension:Stable and near goal.Continue Losartan and ambulatory BP and HR monitoring.  -     losartan (COZAAR) 100 MG tablet; Take 1 tablet by mouth daily    Permanent atrial fibrillation (HCC):Stable.Hr is controlled on no rate slowing medications.In ,we are trying to avoid any HR slowing medications due hx of bradycardia.Continue Xarelto for OAC and stroke risk reduction.  -     rivaroxaban (XARELTO) 15 MG TABS tablet; Take 1 tablet by mouth daily          Disposition:    Return in about 1 year (around 1/15/2026).                IFTIKHAR AMBROSIO MD  1/15/2025  2:52 PM

## 2025-02-10 DIAGNOSIS — I48.21 PERMANENT ATRIAL FIBRILLATION (HCC): ICD-10-CM

## 2025-02-10 NOTE — TELEPHONE ENCOUNTER
Patients daughter called stating she has the pt Acct # for her mom.    1)    # [id number]    Please call and advise.    2)     Patient would like to know if there are any samples available to bridge the delivery period?   at the [place] office.    Please call and advise

## 2025-02-10 NOTE — TELEPHONE ENCOUNTER
Patients daughter called stating her mom has the following issues :    Patients insurance has changed  Needs an effective, affordable alternative to Xarelto.  Discuss [place] pharmacy option    Please call and advise.

## 2025-02-10 NOTE — TELEPHONE ENCOUNTER
Called pt, she gave the verbal clearance for me to speak with Jaimie since she is not listed under [facility]. Gave Jaimie the DMD information. Told her to set up an account and to call back with the patient ID

## 2025-02-11 ENCOUNTER — TELEPHONE (OUTPATIENT)
Age: 80
End: 2025-02-11

## 2025-02-11 DIAGNOSIS — I48.21 PERMANENT ATRIAL FIBRILLATION (HCC): ICD-10-CM

## 2025-02-21 ENCOUNTER — TELEPHONE (OUTPATIENT)
Age: 80
End: 2025-02-21

## 2025-02-21 ENCOUNTER — HOSPITAL ENCOUNTER (INPATIENT)
Age: 80
LOS: 5 days | Discharge: HOME | DRG: 242 | End: 2025-02-26
Attending: INTERNAL MEDICINE | Admitting: INTERNAL MEDICINE
Payer: MEDICARE

## 2025-02-21 ENCOUNTER — APPOINTMENT (OUTPATIENT)
Dept: GENERAL RADIOLOGY | Age: 80
DRG: 242 | End: 2025-02-21
Attending: INTERNAL MEDICINE
Payer: MEDICARE

## 2025-02-21 DIAGNOSIS — R55 SYNCOPE AND COLLAPSE: Primary | ICD-10-CM

## 2025-02-21 DIAGNOSIS — R00.1 SYMPTOMATIC BRADYCARDIA: ICD-10-CM

## 2025-02-21 PROBLEM — I60.9 SAH (SUBARACHNOID HEMORRHAGE) (HCC): Status: ACTIVE | Noted: 2025-02-21

## 2025-02-21 PROBLEM — I50.22 CHRONIC SYSTOLIC (CONGESTIVE) HEART FAILURE (HCC): Status: ACTIVE | Noted: 2025-02-21

## 2025-02-21 LAB
EKG DIAGNOSIS: NORMAL
EKG Q-T INTERVAL: 526 MS
EKG QRS DURATION: 96 MS
EKG QTC CALCULATION (BAZETT): 493 MS
EKG R AXIS: 30 DEGREES
EKG T AXIS: 74 DEGREES
EKG VENTRICULAR RATE: 53 BPM
NT PRO BNP: 1383 PG/ML (ref 0–450)

## 2025-02-21 PROCEDURE — 83880 ASSAY OF NATRIURETIC PEPTIDE: CPT

## 2025-02-21 PROCEDURE — 36415 COLL VENOUS BLD VENIPUNCTURE: CPT

## 2025-02-21 PROCEDURE — 6370000000 HC RX 637 (ALT 250 FOR IP): Performed by: INTERNAL MEDICINE

## 2025-02-21 PROCEDURE — 93005 ELECTROCARDIOGRAM TRACING: CPT | Performed by: INTERNAL MEDICINE

## 2025-02-21 PROCEDURE — 93010 ELECTROCARDIOGRAM REPORT: CPT | Performed by: INTERNAL MEDICINE

## 2025-02-21 PROCEDURE — 99223 1ST HOSP IP/OBS HIGH 75: CPT | Performed by: INTERNAL MEDICINE

## 2025-02-21 PROCEDURE — 71045 X-RAY EXAM CHEST 1 VIEW: CPT

## 2025-02-21 PROCEDURE — 2500000003 HC RX 250 WO HCPCS: Performed by: INTERNAL MEDICINE

## 2025-02-21 PROCEDURE — 2000000000 HC ICU R&B

## 2025-02-21 PROCEDURE — 6360000002 HC RX W HCPCS

## 2025-02-21 RX ORDER — MEMANTINE HYDROCHLORIDE 5 MG/1
5 TABLET ORAL DAILY
COMMUNITY

## 2025-02-21 RX ORDER — DOBUTAMINE HYDROCHLORIDE 200 MG/100ML
2.5-1 INJECTION INTRAVENOUS CONTINUOUS
Status: DISCONTINUED | OUTPATIENT
Start: 2025-02-21 | End: 2025-02-25

## 2025-02-21 RX ORDER — DOBUTAMINE HYDROCHLORIDE 200 MG/100ML
INJECTION INTRAVENOUS
Status: COMPLETED
Start: 2025-02-21 | End: 2025-02-21

## 2025-02-21 RX ORDER — LEVOTHYROXINE SODIUM 88 UG/1
88 TABLET ORAL
Status: DISCONTINUED | OUTPATIENT
Start: 2025-02-22 | End: 2025-02-26 | Stop reason: HOSPADM

## 2025-02-21 RX ORDER — POLYETHYLENE GLYCOL 3350 17 G/17G
17 POWDER, FOR SOLUTION ORAL DAILY PRN
Status: DISCONTINUED | OUTPATIENT
Start: 2025-02-21 | End: 2025-02-26 | Stop reason: HOSPADM

## 2025-02-21 RX ORDER — ACETAMINOPHEN 500 MG
500 TABLET ORAL EVERY 6 HOURS PRN
Status: DISCONTINUED | OUTPATIENT
Start: 2025-02-21 | End: 2025-02-24 | Stop reason: SDUPTHER

## 2025-02-21 RX ORDER — ACETAMINOPHEN 650 MG/1
650 SUPPOSITORY RECTAL EVERY 6 HOURS PRN
Status: DISCONTINUED | OUTPATIENT
Start: 2025-02-21 | End: 2025-02-26 | Stop reason: HOSPADM

## 2025-02-21 RX ORDER — ONDANSETRON 2 MG/ML
4 INJECTION INTRAMUSCULAR; INTRAVENOUS EVERY 6 HOURS PRN
Status: DISCONTINUED | OUTPATIENT
Start: 2025-02-21 | End: 2025-02-26 | Stop reason: HOSPADM

## 2025-02-21 RX ORDER — ESCITALOPRAM OXALATE 10 MG/1
10 TABLET ORAL DAILY
Status: DISCONTINUED | OUTPATIENT
Start: 2025-02-21 | End: 2025-02-26 | Stop reason: HOSPADM

## 2025-02-21 RX ORDER — SODIUM CHLORIDE 9 MG/ML
INJECTION, SOLUTION INTRAVENOUS PRN
Status: DISCONTINUED | OUTPATIENT
Start: 2025-02-21 | End: 2025-02-26 | Stop reason: HOSPADM

## 2025-02-21 RX ORDER — POTASSIUM CHLORIDE 750 MG/1
10 TABLET, EXTENDED RELEASE ORAL DAILY
Status: DISCONTINUED | OUTPATIENT
Start: 2025-02-21 | End: 2025-02-25

## 2025-02-21 RX ORDER — ACETAMINOPHEN 325 MG/1
650 TABLET ORAL EVERY 6 HOURS PRN
Status: DISCONTINUED | OUTPATIENT
Start: 2025-02-21 | End: 2025-02-26 | Stop reason: HOSPADM

## 2025-02-21 RX ORDER — ATORVASTATIN CALCIUM 40 MG/1
40 TABLET, FILM COATED ORAL DAILY
Status: DISCONTINUED | OUTPATIENT
Start: 2025-02-21 | End: 2025-02-26 | Stop reason: HOSPADM

## 2025-02-21 RX ORDER — ONDANSETRON 4 MG/1
4 TABLET, ORALLY DISINTEGRATING ORAL EVERY 8 HOURS PRN
Status: DISCONTINUED | OUTPATIENT
Start: 2025-02-21 | End: 2025-02-26 | Stop reason: HOSPADM

## 2025-02-21 RX ORDER — POTASSIUM CHLORIDE 29.8 MG/ML
20 INJECTION INTRAVENOUS PRN
Status: DISCONTINUED | OUTPATIENT
Start: 2025-02-21 | End: 2025-02-26 | Stop reason: HOSPADM

## 2025-02-21 RX ORDER — ESCITALOPRAM OXALATE 10 MG/1
10 TABLET ORAL DAILY
COMMUNITY

## 2025-02-21 RX ORDER — POTASSIUM CHLORIDE 7.45 MG/ML
10 INJECTION INTRAVENOUS PRN
Status: DISCONTINUED | OUTPATIENT
Start: 2025-02-21 | End: 2025-02-26 | Stop reason: HOSPADM

## 2025-02-21 RX ORDER — SODIUM CHLORIDE 0.9 % (FLUSH) 0.9 %
5-40 SYRINGE (ML) INJECTION PRN
Status: DISCONTINUED | OUTPATIENT
Start: 2025-02-21 | End: 2025-02-26 | Stop reason: HOSPADM

## 2025-02-21 RX ORDER — SODIUM CHLORIDE 0.9 % (FLUSH) 0.9 %
5-40 SYRINGE (ML) INJECTION EVERY 12 HOURS SCHEDULED
Status: DISCONTINUED | OUTPATIENT
Start: 2025-02-21 | End: 2025-02-26 | Stop reason: HOSPADM

## 2025-02-21 RX ORDER — DONEPEZIL HYDROCHLORIDE 5 MG/1
10 TABLET, FILM COATED ORAL NIGHTLY
Status: DISCONTINUED | OUTPATIENT
Start: 2025-02-21 | End: 2025-02-26 | Stop reason: HOSPADM

## 2025-02-21 RX ORDER — MAGNESIUM SULFATE IN WATER 40 MG/ML
2000 INJECTION, SOLUTION INTRAVENOUS PRN
Status: DISCONTINUED | OUTPATIENT
Start: 2025-02-21 | End: 2025-02-26 | Stop reason: HOSPADM

## 2025-02-21 RX ORDER — MEMANTINE HYDROCHLORIDE 5 MG/1
5 TABLET ORAL DAILY
Status: DISCONTINUED | OUTPATIENT
Start: 2025-02-21 | End: 2025-02-26 | Stop reason: HOSPADM

## 2025-02-21 RX ORDER — POLYVINYL ALCOHOL 14 MG/ML
1 SOLUTION/ DROPS OPHTHALMIC PRN
Status: DISCONTINUED | OUTPATIENT
Start: 2025-02-21 | End: 2025-02-26 | Stop reason: HOSPADM

## 2025-02-21 RX ADMIN — DOBUTAMINE IN DEXTROSE 5 MCG/KG/MIN: 200 INJECTION, SOLUTION INTRAVENOUS at 17:22

## 2025-02-21 RX ADMIN — ATORVASTATIN CALCIUM 40 MG: 40 TABLET, FILM COATED ORAL at 18:31

## 2025-02-21 RX ADMIN — DONEPEZIL HYDROCHLORIDE 10 MG: 5 TABLET ORAL at 19:58

## 2025-02-21 RX ADMIN — ESCITALOPRAM OXALATE 10 MG: 10 TABLET ORAL at 18:31

## 2025-02-21 RX ADMIN — POTASSIUM CHLORIDE 10 MEQ: 750 TABLET, EXTENDED RELEASE ORAL at 18:31

## 2025-02-21 RX ADMIN — MEMANTINE 5 MG: 5 TABLET ORAL at 19:57

## 2025-02-21 RX ADMIN — SODIUM CHLORIDE, PRESERVATIVE FREE 10 ML: 5 INJECTION INTRAVENOUS at 20:09

## 2025-02-21 ASSESSMENT — PAIN SCALES - GENERAL
PAINLEVEL_OUTOF10: 0

## 2025-02-21 NOTE — H&P
body weight: 52.4 kg (115 lb 8.3 oz)  Adjusted ideal body weight: 64.1 kg (141 lb 5.7 oz)  Mode FIO2 Rate Tidal Volume Pressure                           Peak airway pressure:     Minute ventilation:    ABG:No results for input(s): \"PHAPOC\", \"LQW3KDPN\", \"HR1NINM\", \"RCL1UPG\", \"BE\" in the last 72 hours.  Assessment and Plan:  (Medical Decision Making)   Impression: 79 y.o. female admitted 2/21/2025 for possible pacemaker placement for atrial fibs and tachybradycardia syndrome.    NEURO: Patient with underlying dementia, disoriented  Sedation: None  Analgesia: As previously been on hydrocodone    CV:   Hemodynamics: Currently on dobutamine at 5 mics, heart rate in the 50s and blood pressure currently is adequate  Cardiology consulted regarding possible need for pacemaker    PULM:   Acute Hypoxemic Respiratory Failure: No issues as patient is on room air     RENAL:   Electrolytes: Will check lab    GI:   Nutrition: And placed on diet    HEME:   Hemoglobin noted to be 11.5 and platelet count was 212 on the 19th.    ID:   Septic Shock: No current evidence for infection    ENDO:   Hyperglycemia: Monitor glucose but patient is not a diabetic    Skin: no decub, turns, preventive care  Prophy: Anticoagulants on hold due to subarachnoid hemorrhage    Family updated by phone after rounds.   Name Home Phone Work Phone Mobile Phone Relationship Lgl Grjeovany   [place]   [phone] Child No   WELL,SANDOVAL   [phone] Child No     No Order        Andrew Zaidi Jr, MD

## 2025-02-21 NOTE — PLAN OF CARE
Problem: Discharge Planning  Goal: Discharge to home or other facility with appropriate resources  Outcome: /[facility] Progressing  Flowsheets (Taken 2/21/2025 6893)  Discharge to home or other facility with appropriate resources:   Identify barriers to discharge with patient and caregiver   Arrange for needed discharge resources and transportation as appropriate   Identify discharge learning needs (meds, wound care, etc)   Arrange for interpreters to assist at discharge as needed   Refer to discharge planning if patient needs post-hospital services based on physician order or complex needs related to functional status, cognitive ability or social support system     Problem: Pain  Goal: Verbalizes/displays adequate comfort level or baseline comfort level  Outcome: /\[facility]\""C Progressing     Problem: Skin/Tissue Integrity  Goal: Skin integrity remains intact  Description: 1.  Monitor for areas of redness and/or skin breakdown  2.  Assess vascular access sites hourly  3.  Every 4-6 hours minimum:  Change oxygen saturation probe site  4.  Every 4-6 hours:  If on nasal continuous positive airway pressure, respiratory therapy assess nares and determine need for appliance change or resting period  Outcome: /[facility] Progressing

## 2025-02-21 NOTE — TELEPHONE ENCOUNTER
Pt's daughter Jaimie called in. Pt is currently in the hospital in [place]. The doctor there is talking about a pacemaker. Pt and her daughter do not want them to do the pacemaker procedure. They want it done by our staff at [facility]. Jaimie said Dr. Cristobal had mentioned a pacemaker to them in the past. Please call Jaimie to discuss

## 2025-02-22 ENCOUNTER — APPOINTMENT (OUTPATIENT)
Dept: NON INVASIVE DIAGNOSTICS | Age: 80
DRG: 242 | End: 2025-02-22
Attending: INTERNAL MEDICINE
Payer: MEDICARE

## 2025-02-22 LAB
ALBUMIN SERPL-MCNC: 3 G/DL (ref 3.2–4.6)
ALBUMIN/GLOB SERPL: 1.1 (ref 1–1.9)
ALP SERPL-CCNC: 91 U/L (ref 35–104)
ALT SERPL-CCNC: 8 U/L (ref 8–45)
ANION GAP SERPL CALC-SCNC: 7 MMOL/L (ref 7–16)
AST SERPL-CCNC: 16 U/L (ref 15–37)
BASOPHILS # BLD: 0.03 K/UL (ref 0–0.2)
BASOPHILS NFR BLD: 0.6 % (ref 0–2)
BILIRUB SERPL-MCNC: 0.9 MG/DL (ref 0–1.2)
BUN SERPL-MCNC: 7 MG/DL (ref 8–23)
CALCIUM SERPL-MCNC: 8.9 MG/DL (ref 8.8–10.2)
CHLORIDE SERPL-SCNC: 105 MMOL/L (ref 98–107)
CO2 SERPL-SCNC: 26 MMOL/L (ref 20–29)
CREAT SERPL-MCNC: 0.6 MG/DL (ref 0.6–1.1)
DIFFERENTIAL METHOD BLD: ABNORMAL
ECHO AV AREA PEAK VELOCITY: 2.3 CM2
ECHO AV AREA VTI: 2.3 CM2
ECHO AV MEAN GRADIENT: 3 MMHG
ECHO AV MEAN VELOCITY: 0.8 M/S
ECHO AV PEAK GRADIENT: 6 MMHG
ECHO AV PEAK GRADIENT: 6 MMHG
ECHO AV PEAK VELOCITY: 1.2 M/S
ECHO AV VELOCITY RATIO: 0.83
ECHO AV VTI: 21.3 CM
ECHO EST RA PRESSURE: 3 MMHG
ECHO LA AREA 2C: 28.6 CM2
ECHO LA AREA 4C: 25.7 CM2
ECHO LA MAJOR AXIS: 5.5 CM
ECHO LA MINOR AXIS: 5.6 CM
ECHO LA VOL BP: 101 ML (ref 22–52)
ECHO LA VOL MOD A2C: 111 ML (ref 22–52)
ECHO LA VOL MOD A4C: 90 ML (ref 22–52)
ECHO LV E' LATERAL VELOCITY: 15.2 CM/S
ECHO LV E' SEPTAL VELOCITY: 12.5 CM/S
ECHO LV EF PHYSICIAN: 60 %
ECHO LV FRACTIONAL SHORTENING: 27 % (ref 28–44)
ECHO LV INTERNAL DIMENSION DIASTOLIC: 5.1 CM (ref 3.9–5.3)
ECHO LV INTERNAL DIMENSION SYSTOLIC: 3.7 CM
ECHO LV IVSD: 0.9 CM (ref 0.6–0.9)
ECHO LV MASS 2D: 163.6 G (ref 67–162)
ECHO LV POSTERIOR WALL DIASTOLIC: 0.9 CM (ref 0.6–0.9)
ECHO LV RELATIVE WALL THICKNESS RATIO: 0.35
ECHO LVOT AREA: 2.8 CM2
ECHO LVOT AV VTI INDEX: 0.8
ECHO LVOT DIAM: 1.9 CM
ECHO LVOT MEAN GRADIENT: 2 MMHG
ECHO LVOT PEAK GRADIENT: 4 MMHG
ECHO LVOT PEAK VELOCITY: 1 M/S
ECHO LVOT SV: 48.5 ML
ECHO LVOT VTI: 17.1 CM
ECHO MV A VELOCITY: 0.27 M/S
ECHO MV E DECELERATION TIME (DT): 198 MS
ECHO MV E VELOCITY: 0.86 M/S
ECHO MV E/A RATIO: 3.19
ECHO MV E/E' LATERAL: 5.66
ECHO MV E/E' RATIO (AVERAGED): 6.27
ECHO MV E/E' SEPTAL: 6.88
ECHO PV AREA CONTINUITY EQ VELOCITY: 2.9 CM2
ECHO PV MAX VELOCITY: 1 M/S
ECHO PV PEAK GRADIENT: 4 MMHG
ECHO QP:QS RATIO: 1.25 NO UNITS
ECHO RVOT AREA: 3.8 CM2
ECHO RVOT DIAMETER: 2.2 CM
ECHO RVOT MEAN GRADIENT: 1 MMHG
ECHO RVOT PEAK GRADIENT: 2 MMHG
ECHO RVOT PEAK VELOCITY: 0.7 M/S
ECHO RVOT STROKE VOLUME: 60.4 ML
ECHO RVOT VTI: 15.9 CM
EOSINOPHIL # BLD: 0.11 K/UL (ref 0–0.8)
EOSINOPHIL NFR BLD: 2.1 % (ref 0.5–7.8)
ERYTHROCYTE [DISTWIDTH] IN BLOOD BY AUTOMATED COUNT: 15.5 % (ref 11.9–14.6)
GLOBULIN SER CALC-MCNC: 2.8 G/DL (ref 2.3–3.5)
GLUCOSE SERPL-MCNC: 89 MG/DL (ref 70–99)
HCT VFR BLD AUTO: 32.9 % (ref 35.8–46.3)
HGB BLD-MCNC: 10.7 G/DL (ref 11.7–15.4)
IMM GRANULOCYTES # BLD AUTO: 0.02 K/UL (ref 0–0.5)
IMM GRANULOCYTES NFR BLD AUTO: 0.4 % (ref 0–5)
LYMPHOCYTES # BLD: 0.78 K/UL (ref 0.5–4.6)
LYMPHOCYTES NFR BLD: 15 % (ref 13–44)
MCH RBC QN AUTO: 29.9 PG (ref 26.1–32.9)
MCHC RBC AUTO-ENTMCNC: 32.5 G/DL (ref 31.4–35)
MCV RBC AUTO: 91.9 FL (ref 82–102)
MONOCYTES # BLD: 0.7 K/UL (ref 0.1–1.3)
MONOCYTES NFR BLD: 13.4 % (ref 4–12)
NEUTS SEG # BLD: 3.57 K/UL (ref 1.7–8.2)
NEUTS SEG NFR BLD: 68.5 % (ref 43–78)
NRBC # BLD: 0 K/UL (ref 0–0.2)
PLATELET # BLD AUTO: 185 K/UL (ref 150–450)
PMV BLD AUTO: 11.6 FL (ref 9.4–12.3)
POTASSIUM SERPL-SCNC: 3.7 MMOL/L (ref 3.5–5.1)
PROT SERPL-MCNC: 5.9 G/DL (ref 6.3–8.2)
RBC # BLD AUTO: 3.58 M/UL (ref 4.05–5.2)
SODIUM SERPL-SCNC: 138 MMOL/L (ref 136–145)
WBC # BLD AUTO: 5.2 K/UL (ref 4.3–11.1)

## 2025-02-22 PROCEDURE — 85025 COMPLETE CBC W/AUTO DIFF WBC: CPT

## 2025-02-22 PROCEDURE — 2500000003 HC RX 250 WO HCPCS: Performed by: INTERNAL MEDICINE

## 2025-02-22 PROCEDURE — 6360000002 HC RX W HCPCS: Performed by: INTERNAL MEDICINE

## 2025-02-22 PROCEDURE — 93321 DOPPLER ECHO F-UP/LMTD STD: CPT

## 2025-02-22 PROCEDURE — 2000000000 HC ICU R&B

## 2025-02-22 PROCEDURE — 99232 SBSQ HOSP IP/OBS MODERATE 35: CPT | Performed by: INTERNAL MEDICINE

## 2025-02-22 PROCEDURE — 93325 DOPPLER ECHO COLOR FLOW MAPG: CPT | Performed by: INTERNAL MEDICINE

## 2025-02-22 PROCEDURE — 6370000000 HC RX 637 (ALT 250 FOR IP): Performed by: INTERNAL MEDICINE

## 2025-02-22 PROCEDURE — 80053 COMPREHEN METABOLIC PANEL: CPT

## 2025-02-22 PROCEDURE — 93321 DOPPLER ECHO F-UP/LMTD STD: CPT | Performed by: INTERNAL MEDICINE

## 2025-02-22 PROCEDURE — 93308 TTE F-UP OR LMTD: CPT | Performed by: INTERNAL MEDICINE

## 2025-02-22 PROCEDURE — 36415 COLL VENOUS BLD VENIPUNCTURE: CPT

## 2025-02-22 RX ADMIN — MEMANTINE 5 MG: 5 TABLET ORAL at 08:26

## 2025-02-22 RX ADMIN — POTASSIUM CHLORIDE 10 MEQ: 750 TABLET, EXTENDED RELEASE ORAL at 08:25

## 2025-02-22 RX ADMIN — SODIUM CHLORIDE, PRESERVATIVE FREE 10 ML: 5 INJECTION INTRAVENOUS at 10:30

## 2025-02-22 RX ADMIN — ATORVASTATIN CALCIUM 40 MG: 40 TABLET, FILM COATED ORAL at 08:26

## 2025-02-22 RX ADMIN — DOBUTAMINE IN DEXTROSE 5 MCG/KG/MIN: 200 INJECTION, SOLUTION INTRAVENOUS at 14:10

## 2025-02-22 RX ADMIN — LEVOTHYROXINE SODIUM 88 MCG: 0.09 TABLET ORAL at 08:26

## 2025-02-22 RX ADMIN — ESCITALOPRAM OXALATE 10 MG: 10 TABLET ORAL at 08:25

## 2025-02-22 RX ADMIN — DONEPEZIL HYDROCHLORIDE 10 MG: 5 TABLET ORAL at 19:44

## 2025-02-22 RX ADMIN — SODIUM CHLORIDE, PRESERVATIVE FREE 10 ML: 5 INJECTION INTRAVENOUS at 19:44

## 2025-02-22 ASSESSMENT — PAIN SCALES - GENERAL
PAINLEVEL_OUTOF10: 0

## 2025-02-22 NOTE — PLAN OF CARE
Problem: Discharge Planning  Goal: Discharge to home or other facility with appropriate resources  2/22/2025 0144 by Anival Carrillo, RN  Outcome: Progressing  Flowsheets (Taken 2/21/2025 1930)  Discharge to home or other facility with appropriate resources:   Identify barriers to discharge with patient and caregiver   Arrange for needed discharge resources and transportation as appropriate   Identify discharge learning needs (meds, wound care, etc)   Refer to discharge planning if patient needs post-hospital services based on physician order or complex needs related to functional status, cognitive ability or social support system  2/21/2025 1707 by Pete Ceballos, RN  Outcome: [facility] Progressing  Flowsheets (Taken 2/21/2025 1657)  Discharge to home or other facility with appropriate resources:   Identify barriers to discharge with patient and caregiver   Arrange for needed discharge resources and transportation as appropriate   Identify discharge learning needs (meds, wound care, etc)   Arrange for interpreters to assist at discharge as needed   Refer to discharge planning if patient needs post-hospital services based on physician order or complex needs related to functional status, cognitive ability or social support system     Problem: Pain  Goal: Verbalizes/displays adequate comfort level or baseline comfort level  2/22/2025 0144 by Anival Carrillo RN  Outcome: Progressing  Flowsheets (Taken 2/21/2025 1930)  Verbalizes/displays adequate comfort level or baseline comfort level:   Encourage patient to monitor pain and request assistance   Assess pain using appropriate pain scale   Administer analgesics based on type and severity of pain and evaluate response  2/21/2025 1707 by Pete Ceballos, RN  Outcome: [facility] Progressing     Problem: Skin/Tissue Integrity  Goal: Skin integrity remains intact  Description: 1.  Monitor for areas of redness and/or skin breakdown  2.  Assess vascular access sites hourly  3.

## 2025-02-22 NOTE — PLAN OF CARE
Problem: Discharge Planning  Goal: Discharge to home or other facility with appropriate resources  2/22/2025 1219 by Princess Valdovinos RN  Outcome: Progressing  Flowsheets (Taken 2/22/2025 0746)  Discharge to home or other facility with appropriate resources: Identify barriers to discharge with patient and caregiver  2/22/2025 0144 by Anival Carrillo RN  Outcome: Progressing  Flowsheets (Taken 2/21/2025 1930)  Discharge to home or other facility with appropriate resources:   Identify barriers to discharge with patient and caregiver   Arrange for needed discharge resources and transportation as appropriate   Identify discharge learning needs (meds, wound care, etc)   Refer to discharge planning if patient needs post-hospital services based on physician order or complex needs related to functional status, cognitive ability or social support system     Problem: Pain  Goal: Verbalizes/displays adequate comfort level or baseline comfort level  2/22/2025 1219 by Princess Valdovinos RN  Outcome: Progressing  Flowsheets (Taken 2/22/2025 0746)  Verbalizes/displays adequate comfort level or baseline comfort level: Assess pain using appropriate pain scale  2/22/2025 0144 by Anival Carrillo RN  Outcome: Progressing  Flowsheets (Taken 2/21/2025 1930)  Verbalizes/displays adequate comfort level or baseline comfort level:   Encourage patient to monitor pain and request assistance   Assess pain using appropriate pain scale   Administer analgesics based on type and severity of pain and evaluate response     Problem: Skin/Tissue Integrity  Goal: Skin integrity remains intact  Description: 1.  Monitor for areas of redness and/or skin breakdown  2.  Assess vascular access sites hourly  3.  Every 4-6 hours minimum:  Change oxygen saturation probe site  4.  Every 4-6 hours:  If on nasal continuous positive airway pressure, respiratory therapy assess nares and determine need for appliance change or resting period  2/22/2025 1219 by Bonifacio

## 2025-02-23 LAB
ANION GAP SERPL CALC-SCNC: 8 MMOL/L (ref 7–16)
BUN SERPL-MCNC: 8 MG/DL (ref 8–23)
CALCIUM SERPL-MCNC: 8.6 MG/DL (ref 8.8–10.2)
CHLORIDE SERPL-SCNC: 104 MMOL/L (ref 98–107)
CO2 SERPL-SCNC: 27 MMOL/L (ref 20–29)
CREAT SERPL-MCNC: 0.66 MG/DL (ref 0.6–1.1)
GLUCOSE SERPL-MCNC: 88 MG/DL (ref 70–99)
POTASSIUM SERPL-SCNC: 3.8 MMOL/L (ref 3.5–5.1)
SODIUM SERPL-SCNC: 139 MMOL/L (ref 136–145)

## 2025-02-23 PROCEDURE — 80048 BASIC METABOLIC PNL TOTAL CA: CPT

## 2025-02-23 PROCEDURE — 6360000002 HC RX W HCPCS: Performed by: INTERNAL MEDICINE

## 2025-02-23 PROCEDURE — 36415 COLL VENOUS BLD VENIPUNCTURE: CPT

## 2025-02-23 PROCEDURE — 2500000003 HC RX 250 WO HCPCS: Performed by: INTERNAL MEDICINE

## 2025-02-23 PROCEDURE — 99232 SBSQ HOSP IP/OBS MODERATE 35: CPT | Performed by: INTERNAL MEDICINE

## 2025-02-23 PROCEDURE — 6370000000 HC RX 637 (ALT 250 FOR IP): Performed by: INTERNAL MEDICINE

## 2025-02-23 PROCEDURE — 1100000003 HC PRIVATE W/ TELEMETRY

## 2025-02-23 RX ADMIN — DONEPEZIL HYDROCHLORIDE 10 MG: 5 TABLET ORAL at 20:47

## 2025-02-23 RX ADMIN — MEMANTINE 5 MG: 5 TABLET ORAL at 09:15

## 2025-02-23 RX ADMIN — SODIUM CHLORIDE, PRESERVATIVE FREE 10 ML: 5 INJECTION INTRAVENOUS at 09:15

## 2025-02-23 RX ADMIN — SODIUM CHLORIDE, PRESERVATIVE FREE 10 ML: 5 INJECTION INTRAVENOUS at 20:47

## 2025-02-23 RX ADMIN — ATORVASTATIN CALCIUM 40 MG: 40 TABLET, FILM COATED ORAL at 09:15

## 2025-02-23 RX ADMIN — POTASSIUM CHLORIDE 10 MEQ: 750 TABLET, EXTENDED RELEASE ORAL at 09:15

## 2025-02-23 RX ADMIN — LEVOTHYROXINE SODIUM 88 MCG: 0.09 TABLET ORAL at 09:15

## 2025-02-23 RX ADMIN — ESCITALOPRAM OXALATE 10 MG: 10 TABLET ORAL at 09:15

## 2025-02-23 RX ADMIN — DOBUTAMINE IN DEXTROSE 5 MCG/KG/MIN: 200 INJECTION, SOLUTION INTRAVENOUS at 17:43

## 2025-02-23 ASSESSMENT — PAIN SCALES - GENERAL
PAINLEVEL_OUTOF10: 0

## 2025-02-23 NOTE — PLAN OF CARE
Problem: Discharge Planning  Goal: Discharge to home or other facility with appropriate resources  Outcome: Progressing  Flowsheets (Taken 2/23/2025 0800 by Nasima Pittman)  Discharge to home or other facility with appropriate resources:   Identify barriers to discharge with patient and caregiver   Arrange for needed discharge resources and transportation as appropriate   Identify discharge learning needs (meds, wound care, etc)   Arrange for interpreters to assist at discharge as needed   Refer to discharge planning if patient needs post-hospital services based on physician order or complex needs related to functional status, cognitive ability or social support system     Problem: Pain  Goal: Verbalizes/displays adequate comfort level or baseline comfort level  Outcome: Progressing  Flowsheets (Taken 2/23/2025 0800)  Verbalizes/displays adequate comfort level or baseline comfort level: Encourage patient to monitor pain and request assistance     Problem: Skin/Tissue Integrity  Goal: Skin integrity remains intact  Description: 1.  Monitor for areas of redness and/or skin breakdown  2.  Assess vascular access sites hourly  3.  Every 4-6 hours minimum:  Change oxygen saturation probe site  4.  Every 4-6 hours:  If on nasal continuous positive airway pressure, respiratory therapy assess nares and determine need for appliance change or resting period  Outcome: Progressing  Flowsheets  Taken 2/23/2025 0947 by Nasima Pittman  Skin Integrity Remains Intact:   Monitor for areas of redness and/or skin breakdown   Assess vascular access sites hourly   Every 4-6 hours minimum: Change oxygen saturation probe site   Every 4-6 hours: If on nasal continuous positive airway pressure, respiratory therapy assesses nares and determine need for appliance change or resting period  Taken 2/23/2025 0800 by Nasima Pittman  Skin Integrity Remains Intact:   Monitor for areas of redness and/or skin breakdown   Assess

## 2025-02-24 ENCOUNTER — APPOINTMENT (OUTPATIENT)
Dept: GENERAL RADIOLOGY | Age: 80
DRG: 242 | End: 2025-02-24
Attending: INTERNAL MEDICINE
Payer: MEDICARE

## 2025-02-24 ENCOUNTER — ANESTHESIA (OUTPATIENT)
Dept: CARDIAC CATH/INVASIVE PROCEDURES | Age: 80
DRG: 242 | End: 2025-02-24
Payer: MEDICARE

## 2025-02-24 ENCOUNTER — ANESTHESIA EVENT (OUTPATIENT)
Dept: CARDIAC CATH/INVASIVE PROCEDURES | Age: 80
DRG: 242 | End: 2025-02-24
Payer: MEDICARE

## 2025-02-24 LAB
ANION GAP SERPL CALC-SCNC: 8 MMOL/L (ref 7–16)
BUN SERPL-MCNC: 14 MG/DL (ref 8–23)
CALCIUM SERPL-MCNC: 8.6 MG/DL (ref 8.8–10.2)
CHLORIDE SERPL-SCNC: 104 MMOL/L (ref 98–107)
CO2 SERPL-SCNC: 26 MMOL/L (ref 20–29)
CREAT SERPL-MCNC: 0.66 MG/DL (ref 0.6–1.1)
GLUCOSE SERPL-MCNC: 94 MG/DL (ref 70–99)
POTASSIUM SERPL-SCNC: 3.8 MMOL/L (ref 3.5–5.1)
SODIUM SERPL-SCNC: 138 MMOL/L (ref 136–145)

## 2025-02-24 PROCEDURE — 2720000010 HC SURG SUPPLY STERILE: Performed by: INTERNAL MEDICINE

## 2025-02-24 PROCEDURE — 0JH607Z INSERTION OF CARDIAC RESYNCHRONIZATION PACEMAKER PULSE GENERATOR INTO CHEST SUBCUTANEOUS TISSUE AND FASCIA, OPEN APPROACH: ICD-10-PCS | Performed by: INTERNAL MEDICINE

## 2025-02-24 PROCEDURE — 80048 BASIC METABOLIC PNL TOTAL CA: CPT

## 2025-02-24 PROCEDURE — 02HK3KZ INSERTION OF DEFIBRILLATOR LEAD INTO RIGHT VENTRICLE, PERCUTANEOUS APPROACH: ICD-10-PCS | Performed by: INTERNAL MEDICINE

## 2025-02-24 PROCEDURE — 2500000003 HC RX 250 WO HCPCS: Performed by: INTERNAL MEDICINE

## 2025-02-24 PROCEDURE — 1100000003 HC PRIVATE W/ TELEMETRY

## 2025-02-24 PROCEDURE — 6360000002 HC RX W HCPCS: Performed by: NURSE ANESTHETIST, CERTIFIED REGISTERED

## 2025-02-24 PROCEDURE — C2621 PMKR, OTHER THAN SING/DUAL: HCPCS | Performed by: INTERNAL MEDICINE

## 2025-02-24 PROCEDURE — 3700000001 HC ADD 15 MINUTES (ANESTHESIA): Performed by: INTERNAL MEDICINE

## 2025-02-24 PROCEDURE — 2500000003 HC RX 250 WO HCPCS: Performed by: NURSE ANESTHETIST, CERTIFIED REGISTERED

## 2025-02-24 PROCEDURE — 2580000003 HC RX 258: Performed by: NURSE ANESTHETIST, CERTIFIED REGISTERED

## 2025-02-24 PROCEDURE — 6370000000 HC RX 637 (ALT 250 FOR IP): Performed by: INTERNAL MEDICINE

## 2025-02-24 PROCEDURE — 33225 L VENTRIC PACING LEAD ADD-ON: CPT | Performed by: INTERNAL MEDICINE

## 2025-02-24 PROCEDURE — 33208 INSRT HEART PM ATRIAL & VENT: CPT | Performed by: INTERNAL MEDICINE

## 2025-02-24 PROCEDURE — 71045 X-RAY EXAM CHEST 1 VIEW: CPT

## 2025-02-24 PROCEDURE — 36415 COLL VENOUS BLD VENIPUNCTURE: CPT

## 2025-02-24 PROCEDURE — 2709999900 HC NON-CHARGEABLE SUPPLY: Performed by: INTERNAL MEDICINE

## 2025-02-24 PROCEDURE — 99232 SBSQ HOSP IP/OBS MODERATE 35: CPT | Performed by: INTERNAL MEDICINE

## 2025-02-24 PROCEDURE — C1900 LEAD, CORONARY VENOUS: HCPCS | Performed by: INTERNAL MEDICINE

## 2025-02-24 PROCEDURE — 02HL3JZ INSERTION OF PACEMAKER LEAD INTO LEFT VENTRICLE, PERCUTANEOUS APPROACH: ICD-10-PCS | Performed by: INTERNAL MEDICINE

## 2025-02-24 PROCEDURE — 6360000004 HC RX CONTRAST MEDICATION: Performed by: INTERNAL MEDICINE

## 2025-02-24 PROCEDURE — 3700000000 HC ANESTHESIA ATTENDED CARE: Performed by: INTERNAL MEDICINE

## 2025-02-24 PROCEDURE — 6360000002 HC RX W HCPCS: Performed by: INTERNAL MEDICINE

## 2025-02-24 PROCEDURE — C1892 INTRO/SHEATH,FIXED,PEEL-AWAY: HCPCS | Performed by: INTERNAL MEDICINE

## 2025-02-24 PROCEDURE — 33207 INSERT HEART PM VENTRICULAR: CPT | Performed by: INTERNAL MEDICINE

## 2025-02-24 PROCEDURE — C1898 LEAD, PMKR, OTHER THAN TRANS: HCPCS | Performed by: INTERNAL MEDICINE

## 2025-02-24 PROCEDURE — C1769 GUIDE WIRE: HCPCS | Performed by: INTERNAL MEDICINE

## 2025-02-24 PROCEDURE — C1894 INTRO/SHEATH, NON-LASER: HCPCS | Performed by: INTERNAL MEDICINE

## 2025-02-24 DEVICE — PACE/SENSE LEAD
Type: IMPLANTABLE DEVICE | Status: FUNCTIONAL
Brand: ACUITY™ X4 STRAIGHT

## 2025-02-24 DEVICE — CARDIAC RESYNCHRONIZATION THERAPY PACEMAKER
Type: IMPLANTABLE DEVICE | Status: FUNCTIONAL
Brand: VISIONIST™ X4 CRT-P

## 2025-02-24 DEVICE — PACE/SENSE LEAD
Type: IMPLANTABLE DEVICE | Status: FUNCTIONAL
Brand: INGEVITY™+

## 2025-02-24 RX ORDER — PROPOFOL 10 MG/ML
INJECTION, EMULSION INTRAVENOUS
Status: DISCONTINUED | OUTPATIENT
Start: 2025-02-24 | End: 2025-02-24 | Stop reason: SDUPTHER

## 2025-02-24 RX ORDER — SODIUM CHLORIDE, SODIUM LACTATE, POTASSIUM CHLORIDE, CALCIUM CHLORIDE 600; 310; 30; 20 MG/100ML; MG/100ML; MG/100ML; MG/100ML
INJECTION, SOLUTION INTRAVENOUS
Status: DISCONTINUED | OUTPATIENT
Start: 2025-02-24 | End: 2025-02-24 | Stop reason: SDUPTHER

## 2025-02-24 RX ORDER — IOPAMIDOL 755 MG/ML
INJECTION, SOLUTION INTRAVASCULAR PRN
Status: DISCONTINUED | OUTPATIENT
Start: 2025-02-24 | End: 2025-02-24 | Stop reason: HOSPADM

## 2025-02-24 RX ORDER — OXYCODONE HYDROCHLORIDE 5 MG/1
5 TABLET ORAL EVERY 4 HOURS PRN
Status: DISCONTINUED | OUTPATIENT
Start: 2025-02-24 | End: 2025-02-26 | Stop reason: HOSPADM

## 2025-02-24 RX ORDER — LIDOCAINE HYDROCHLORIDE AND EPINEPHRINE 10; 10 MG/ML; UG/ML
INJECTION, SOLUTION INFILTRATION; PERINEURAL PRN
Status: DISCONTINUED | OUTPATIENT
Start: 2025-02-24 | End: 2025-02-24 | Stop reason: HOSPADM

## 2025-02-24 RX ORDER — ACETAMINOPHEN 325 MG/1
650 TABLET ORAL EVERY 6 HOURS
Status: DISCONTINUED | OUTPATIENT
Start: 2025-02-25 | End: 2025-02-26 | Stop reason: HOSPADM

## 2025-02-24 RX ADMIN — ACETAMINOPHEN 650 MG: 325 TABLET ORAL at 18:17

## 2025-02-24 RX ADMIN — POTASSIUM CHLORIDE 10 MEQ: 750 TABLET, EXTENDED RELEASE ORAL at 09:15

## 2025-02-24 RX ADMIN — MEMANTINE 5 MG: 5 TABLET ORAL at 09:15

## 2025-02-24 RX ADMIN — ESCITALOPRAM OXALATE 10 MG: 10 TABLET ORAL at 09:15

## 2025-02-24 RX ADMIN — SODIUM CHLORIDE, SODIUM LACTATE, POTASSIUM CHLORIDE, AND CALCIUM CHLORIDE: 600; 310; 30; 20 INJECTION, SOLUTION INTRAVENOUS at 15:40

## 2025-02-24 RX ADMIN — OXYCODONE HYDROCHLORIDE 5 MG: 5 TABLET ORAL at 22:39

## 2025-02-24 RX ADMIN — PROPOFOL 100 MCG/KG/MIN: 10 INJECTION, EMULSION INTRAVENOUS at 15:52

## 2025-02-24 RX ADMIN — SODIUM CHLORIDE, PRESERVATIVE FREE 10 ML: 5 INJECTION INTRAVENOUS at 20:16

## 2025-02-24 RX ADMIN — WATER 2000 MG: 1 INJECTION INTRAMUSCULAR; INTRAVENOUS; SUBCUTANEOUS at 15:54

## 2025-02-24 RX ADMIN — PROPOFOL 30 MG: 10 INJECTION, EMULSION INTRAVENOUS at 15:51

## 2025-02-24 RX ADMIN — LEVOTHYROXINE SODIUM 88 MCG: 0.09 TABLET ORAL at 05:14

## 2025-02-24 RX ADMIN — SODIUM CHLORIDE, PRESERVATIVE FREE 10 ML: 5 INJECTION INTRAVENOUS at 09:16

## 2025-02-24 RX ADMIN — DONEPEZIL HYDROCHLORIDE 10 MG: 5 TABLET ORAL at 20:16

## 2025-02-24 RX ADMIN — ATORVASTATIN CALCIUM 40 MG: 40 TABLET, FILM COATED ORAL at 09:15

## 2025-02-24 ASSESSMENT — PAIN DESCRIPTION - DESCRIPTORS: DESCRIPTORS: ACHING;DISCOMFORT

## 2025-02-24 ASSESSMENT — PAIN DESCRIPTION - LOCATION: LOCATION: SHOULDER

## 2025-02-24 ASSESSMENT — PAIN SCALES - GENERAL
PAINLEVEL_OUTOF10: 0
PAINLEVEL_OUTOF10: 0
PAINLEVEL_OUTOF10: 6

## 2025-02-24 ASSESSMENT — PAIN DESCRIPTION - ORIENTATION: ORIENTATION: LEFT

## 2025-02-24 NOTE — ICUWATCH
RRT Clinical Rounding Nurse Progress Report      SUBJECTIVE: Patient assessed secondary to transfer from critical care.      Vitals:    02/24/25 1203 02/24/25 1720 02/24/25 1730 02/24/25 1745   BP: 129/73 134/77 (!) 145/83 (!) 148/87   Pulse: 75 55 60 56   Resp: 16 20  (!) 0   Temp: 99 °F (37.2 °C) 97.7 °F (36.5 °C)     TempSrc: Temporal Tympanic     SpO2: 93% 99% 100% 100%   Weight:            ASSESSMENT:  Pt resting quietly in bed, in NAD.  Tangir[place]q. Awakens to voice and is oriented x4.  Lung sounds clear.  On room air.  Afib, HR 70 on remote telemetry.  BP stable.  On dobutamine gtt.  NPO at this time for scheduled PM placement this afternoon. No complaints at this time. Family at bedside.       PLAN:  Will discharge from RRT Clinical Rounding Program per protocol. Please call if needed.    Tala Covarrubias RN  [facility]: [phone]  [facility]: [phone]

## 2025-02-24 NOTE — ICUWATCH
RRT Clinical Rounding Nurse Update    Vitals:    02/23/25 1600 02/23/25 1648 02/23/25 2012 02/24/25 0002   BP: 130/74 129/82 126/63 134/66   Pulse: 67 68 68 76   Resp: 17 16 16 18   Temp:  97.3 °F (36.3 °C) 98.2 °F (36.8 °C) 98.2 °F (36.8 °C)   TempSrc:  Axillary Oral Oral   SpO2: 94% 93% 96% 96%   Weight:            DETERIORATION INDEX SCORE: 23    ASSESSMENT:  Previous outreach assessment was reviewed. There have been no significant changes since previous assessment.    PLAN:  Will follow per RRT Clinical Rounding Program protocol.    Anival Carrillo RN  [facility]: [phone]  [facility]: [phone]

## 2025-02-24 NOTE — CARE COORDINATION
02/24/25 1532   Service Assessment   Patient Orientation Alert and Oriented   Cognition Alert   History Provided By Medical Record   Primary Caregiver Self   Accompanied By/Relationship No one at bedside   Support Systems Children   Patient's Healthcare Decision Maker is: Legal Next of Kin   PCP Verified by CM Yes  (Dr Quinones)   Last Visit to PCP Within last 3 months   Prior Functional Level Independent in ADLs/IADLs   Current Functional Level Independent in ADLs/IADLs   Can patient return to prior living arrangement Yes   Ability to make needs known: Good   Family able to assist with home care needs: Yes   Would you like for me to discuss the discharge plan with any other family members/significant others, and if so, who? Yes  (children)   Financial Resources Medicare   Community Resources None   CM/SW Referral Other (see comment)  (N/A)   Discharge Planning   Type of Residence House   Potential Assistance Needed N/A   DME Ordered? No   Potential Assistance Purchasing Medications No   Type of Home Care Services None   Patient expects to be discharged to: House   Services At/After Discharge   Transition of Care Consult (CM Consult) Discharge Planning   Services At/After Discharge None   [place] Resource Information Provided? No   Mode of Transport at Discharge Other (see comment)  (Family member)   Confirm Follow Up Transport Family   Condition of Participation: Discharge Planning   The Plan for Transition of Care is related to the following treatment goals: Home with family assistance     Patient admitted with bradycardia. Cardiology consulted. Patient off the floor for PPI.  CM reviewed clinical notes for assessment. Patient is established with PCP and insured for follow up. Patient's daughter is primary caregiver.   CM will follow to remain available for consult.

## 2025-02-24 NOTE — ICUWATCH
RRT Clinical Rounding Nurse Progress Report      SUBJECTIVE: Patient assessed secondary to transfer from critical care.      Vitals:    02/23/25 1545 02/23/25 1600 02/23/25 1648 02/23/25 2012   BP:  130/74 129/82 126/63   Pulse: 58 67 68 68   Resp: 28 17 16 16   Temp:   97.3 °F (36.3 °C) 98.2 °F (36.8 °C)   TempSrc:   Axillary Oral   SpO2: 95% 94% 93% 96%   Weight:            DETERIORATION INDEX SCORE: 21    ASSESSMENT:  Pt resting in bed on RA, A&O x3, dobutamine gtt still running. Pt denies CP, SOB, or palpitations. HR on assessment 70. Chest expansion symmetric and unlabored, lung sounds clear and diminished. No apparent distress at this time. Recent labs, VS, and progress notes reviewed.    PLAN:  Will follow per RRT Clinical Rounding Program protocol.    Anival Carrillo RN  [facility]: [phone]  [facility]: [phone]

## 2025-02-24 NOTE — ANESTHESIA POSTPROCEDURE EVALUATION
Department of Anesthesiology  Postprocedure Note    Patient: Josefina Young  MRN: [id number]  YOB: 1945  Date of evaluation: 2/24/2025    Procedure Summary       Date: 02/24/25 Room / Location: [place] [address] ALL EVENTS / D CARDIAC CATH LAB    Anesthesia Start: 1540 Anesthesia Stop: 1720    Procedures:       Lv lead placement      Insert PPM single ventricular Diagnosis:       Symptomatic bradycardia      (Symptomatic bradycardia [R00.1])    Providers: Pete Saravia MD Responsible Provider: Duy Brown Jr., MD    Anesthesia Type: TIVA ASA Status: 4            Anesthesia Type: TIVA    Win Phase I: Win Score: 10    Win Phase II:      Anesthesia Post Evaluation    Patient location during evaluation: PACU  Patient participation: complete - patient participated  Level of consciousness: awake  Pain score: 0  Airway patency: patent  Nausea & Vomiting: no nausea and no vomiting  Cardiovascular status: blood pressure returned to baseline and hemodynamically stable  Respiratory status: acceptable, spontaneous ventilation and nonlabored ventilation  Hydration status: euvolemic  Multimodal analgesia pain management approach  Pain management: adequate    There were no known notable events for this encounter.

## 2025-02-24 NOTE — ANESTHESIA PRE PROCEDURE
Syncope        Past Surgical History:        Procedure Laterality Date   • APPENDECTOMY     • BLADDER SUSPENSION      bladder tack   • CARDIAC CATHETERIZATION Left 04/15/2011    Normal coronary arteries.Normal LV function   • HYSTERECTOMY (CERVIX STATUS UNKNOWN)     • OTHER SURGICAL HISTORY      thyroid replacement   • TOTAL KNEE ARTHROPLASTY Bilateral        Social History:    Social History     Tobacco Use   • Smoking status: Never   • Smokeless tobacco: Never   Substance Use Topics   • Alcohol use: No                                Counseling given: Not Answered      Vital Signs (Current):   Vitals:    02/24/25 0715 02/24/25 0852 02/24/25 1117 02/24/25 1203   BP: 108/67 114/71 124/73 129/73   Pulse: 80 83 62 75   Resp: 16 16 18 16   Temp: 97.9 °F (36.6 °C) 97.9 °F (36.6 °C) 98.1 °F (36.7 °C) 99 °F (37.2 °C)   TempSrc: Oral Oral Oral Temporal   SpO2: 95% 93% 94% 93%   Weight:                                                  BP Readings from Last 3 Encounters:   02/24/25 129/73   01/15/25 108/80   07/11/24 110/68       NPO Status:                                                                                 BMI:   Wt Readings from Last 3 Encounters:   02/24/25 82.9 kg (182 lb 11.2 oz)   01/15/25 79.4 kg (175 lb)   07/11/24 78.8 kg (173 lb 12.8 oz)     Body mass index is 32.36 kg/m².    CBC:   Lab Results   Component Value Date/Time    WBC 5.2 02/22/2025 03:50 AM    RBC 3.58 02/22/2025 03:50 AM    HGB 10.7 02/22/2025 03:50 AM    HCT 32.9 02/22/2025 03:50 AM    MCV 91.9 02/22/2025 03:50 AM    RDW 15.5 02/22/2025 03:50 AM     02/22/2025 03:50 AM       CMP:   Lab Results   Component Value Date/Time     02/24/2025 05:11 AM    K 3.8 02/24/2025 05:11 AM     02/24/2025 05:11 AM    CO2 26 02/24/2025 05:11 AM    BUN 14 02/24/2025 05:11 AM    CREATININE 0.66 02/24/2025 05:11 AM    LABGLOM 88 02/24/2025 05:11 AM    GLUCOSE 94 02/24/2025 05:11 AM    CALCIUM 8.6 02/24/2025 05:11 AM    BILITOT 0.9 02/22/2025

## 2025-02-25 PROCEDURE — 97161 PT EVAL LOW COMPLEX 20 MIN: CPT

## 2025-02-25 PROCEDURE — 6370000000 HC RX 637 (ALT 250 FOR IP): Performed by: INTERNAL MEDICINE

## 2025-02-25 PROCEDURE — 1100000003 HC PRIVATE W/ TELEMETRY

## 2025-02-25 PROCEDURE — 99232 SBSQ HOSP IP/OBS MODERATE 35: CPT | Performed by: INTERNAL MEDICINE

## 2025-02-25 PROCEDURE — 97530 THERAPEUTIC ACTIVITIES: CPT

## 2025-02-25 PROCEDURE — 2500000003 HC RX 250 WO HCPCS: Performed by: INTERNAL MEDICINE

## 2025-02-25 RX ADMIN — ATORVASTATIN CALCIUM 40 MG: 40 TABLET, FILM COATED ORAL at 08:30

## 2025-02-25 RX ADMIN — POTASSIUM CHLORIDE 10 MEQ: 750 TABLET, EXTENDED RELEASE ORAL at 08:30

## 2025-02-25 RX ADMIN — ACETAMINOPHEN 650 MG: 325 TABLET ORAL at 00:22

## 2025-02-25 RX ADMIN — DONEPEZIL HYDROCHLORIDE 10 MG: 5 TABLET ORAL at 20:25

## 2025-02-25 RX ADMIN — SODIUM CHLORIDE, PRESERVATIVE FREE 10 ML: 5 INJECTION INTRAVENOUS at 20:25

## 2025-02-25 RX ADMIN — ACETAMINOPHEN 650 MG: 325 TABLET ORAL at 12:04

## 2025-02-25 RX ADMIN — LEVOTHYROXINE SODIUM 88 MCG: 0.09 TABLET ORAL at 05:03

## 2025-02-25 RX ADMIN — ESCITALOPRAM OXALATE 10 MG: 10 TABLET ORAL at 08:30

## 2025-02-25 RX ADMIN — ACETAMINOPHEN 650 MG: 325 TABLET ORAL at 17:34

## 2025-02-25 RX ADMIN — ACETAMINOPHEN 650 MG: 325 TABLET ORAL at 04:57

## 2025-02-25 RX ADMIN — SODIUM CHLORIDE, PRESERVATIVE FREE 10 ML: 5 INJECTION INTRAVENOUS at 08:31

## 2025-02-25 RX ADMIN — MEMANTINE 5 MG: 5 TABLET ORAL at 08:30

## 2025-02-25 ASSESSMENT — PAIN DESCRIPTION - ORIENTATION
ORIENTATION: LEFT

## 2025-02-25 ASSESSMENT — PAIN - FUNCTIONAL ASSESSMENT
PAIN_FUNCTIONAL_ASSESSMENT: ACTIVITIES ARE NOT PREVENTED
PAIN_FUNCTIONAL_ASSESSMENT: ACTIVITIES ARE NOT PREVENTED

## 2025-02-25 ASSESSMENT — PAIN DESCRIPTION - DESCRIPTORS
DESCRIPTORS: ACHING;DISCOMFORT
DESCRIPTORS: SORE
DESCRIPTORS: ACHING;DISCOMFORT
DESCRIPTORS: SORE;DISCOMFORT

## 2025-02-25 ASSESSMENT — PAIN DESCRIPTION - LOCATION
LOCATION: SHOULDER

## 2025-02-25 ASSESSMENT — PAIN DESCRIPTION - ONSET: ONSET: GRADUAL

## 2025-02-25 ASSESSMENT — PAIN DESCRIPTION - FREQUENCY: FREQUENCY: INTERMITTENT

## 2025-02-25 ASSESSMENT — PAIN SCALES - GENERAL
PAINLEVEL_OUTOF10: 3
PAINLEVEL_OUTOF10: 5
PAINLEVEL_OUTOF10: 5
PAINLEVEL_OUTOF10: 3
PAINLEVEL_OUTOF10: 4

## 2025-02-25 ASSESSMENT — PAIN DESCRIPTION - PAIN TYPE: TYPE: SURGICAL PAIN

## 2025-02-25 NOTE — CARE COORDINATION
02/25/25 1322   Discharge Planning   Patient expects to be discharged to: House   Services At/After Discharge   Transition of Care Consult (CM Consult) Transportation Assistance   Services At/After Discharge None   [place] Resource Information Provided? No   Mode of Transport at Discharge Other (see comment)  (Family member to transport by car.)   Confirm Follow Up Transport Family   Condition of Participation: Discharge Planning   The Plan for Transition of Care is related to the following treatment goals: Home with family assistance   The Patient and/Or Patient Representative agree with the Discharge Plan? Yes     Discharge order placed. Discharge summary reviewed. No CM needs identified. Family to transport patient home. Follow up with PCP and Cardiology.

## 2025-02-25 NOTE — CONSULTS
[facility] Cardiology Initial Cardiac Evaluation                 Date of  Admission: 2/21/2025  4:22 PM     Primary Care Physician: Devan Quinones MD  Primary Cardiologist: Dr Cristobal  Referring Physician: Dr Zaidi  Attending Physician: Dr Rashid    CC: bradycardia       Josefina Young is a 79 y.o. female admitted for bradycardia.  She has a h/o dementia, perm PAF, htn, and vertigo. She has a h/o bradycardia and has not been on rate slowing meds for a fib bc of h/o bradycardia. Presented to [facility] ER after syncopal episode and fall, noted to have small SAH.  Xarelto held.  Pt with bradycardia, HR in the 30's.  Pt wore a 7 day zio monitor 11/2023 which showed HR  w three pauses longest 3 seconds.  Plan was to repeat zio monitor off BB but I do not see another holter study. She was admitted to [facility], seen by their cardiology, concern for wide QRS and need for BIV.  Pt on low dose dopamine.  She is being transferred to [facility] for further management.     Review of records from [facility] show CT head hyperdense SAH along the L parietal lobe. Neurology was consulted, started on keppra, she had persistent bradyacrdia w HR around 35 not improved w atropine, started on dobutamine, xarelto on hold. Today BMP wnl, albumin 3, hgb 10.4, UDS neg, hgb A1C 6.1, mag 2, TSH 8, trop 19.  2/21/25 TTE EF 45-50%, mild LV global hypokinesis, mod YARELY, mild AR. EKG today a fib w rate 63, EKG yesterday a fib w rate 71. BP 78/57. Dobutamine at 5 mcg.    Pt is lethargic, non verbal, and history is obtained from daughter who is the patient's caretaker.  The pt fell three months ago.  She has had spells of diaphoresis, nausea and epigastric pain but was overall doing well.  Not on any rate slowing meds.  Wed evening daughter cooking dinner and pt standing nearby and suddenly slumped over the counter and complained of being dizzy and was not very responsive. Daughter helped her to sit in a chair.  She then had diaphoresis, grabbed 
List Cleanup:  Cardiology consult completed.  Please see note from Cardiology day team on 02/21/25.    Signed,     Bettina Chandlers, CNP  2/21/2025  0052    
coronary sinus using contrast when necessary.  A Glide wire and was used to allow a smooth transition into the CS body.  A coronary sinus venogram was then performed. A Merit renal inner sheath was used with an Acuity Straight trak to cannulate a posterolateral branch. The renal was advanced into the posterolateral and a sub selected venogram was performed.  The left ventricular lead was then advanced into a posterolateral brach over an angioplasty wire.  Adequate thresholds were obtained with an adequate margin between phrenic stim and loss of capture. The delivery sheaths were then slit with fluoroscopy demonstrating stable position. The lead was then sutured to the underlying pectoralis fascia using 2 non-absorbable sutures around the lead collar.  Final lead testing via the pacing system analyzer (PSA) demonstrated stable sensing, impedance and pacing thresholds. The lead pins were then cleaned with antibiotic soaked gauze, dried gently, and attached to a new pacemaker generator. Pins were directly observed to pass the tip electrode, and the ring hex wrench screws were secured, and leads tug tested. The device and leads were gently positioned within the pocket after the pocket was irrigated copiously with a saline antimicrobial solution. The wound was closed with multiple layers of absorbable suture followed skin closure with staples. The patient tolerated the procedure well and left the lab in good condition. Lead Data: Pulse Generator Model #  Serial # Location Implant/Explant [id number] [facility] [id number] Left Pectoral Implant 02.24.2025 Lead Model Number  Serial Number Lead position Implant/Explant RV [id number] [facility] [id number] RV [place] Implant 02.24.2025 LV [id number] [facility] [id number] LV Mid Lateral Implant 02.24.2025 RA Port Plugged 02.24.2025 Lead Sensitivity and Threshold Lead R or P sensitivity (mv) Threshold (V) Threshold PW (msec) Impedance (ohms) Final output Voltage (V) Final PW (msec) RV 11.9 0.5 0.4 700 3.5

## 2025-02-25 NOTE — DISCHARGE SUMMARY
minutes.      Signed:  José Luis Burnett MD    Part of this note may have been written by using a voice dictation software.  The note has been proof read but may still contain some grammatical/other typographical errors.

## 2025-02-25 NOTE — PLAN OF CARE
Problem: Discharge Planning  Goal: Discharge to home or other facility with appropriate resources  Outcome: Progressing  Flowsheets (Taken 2/24/2025 2020)  Discharge to home or other facility with appropriate resources:   Identify barriers to discharge with patient and caregiver   Arrange for needed discharge resources and transportation as appropriate   Identify discharge learning needs (meds, wound care, etc)     Problem: Pain  Goal: Verbalizes/displays adequate comfort level or baseline comfort level  Outcome: Progressing  Flowsheets (Taken 2/24/2025 2020)  Verbalizes/displays adequate comfort level or baseline comfort level:   Encourage patient to monitor pain and request assistance   Assess pain using appropriate pain scale   Administer analgesics based on type and severity of pain and evaluate response     Problem: Skin/Tissue Integrity  Goal: Skin integrity remains intact  Description: 1.  Monitor for areas of redness and/or skin breakdown  2.  Assess vascular access sites hourly  3.  Every 4-6 hours minimum:  Change oxygen saturation probe site  4.  Every 4-6 hours:  If on nasal continuous positive airway pressure, respiratory therapy assess nares and determine need for appliance change or resting period  Outcome: Progressing  Flowsheets (Taken 2/24/2025 2020)  Skin Integrity Remains Intact:   Monitor for areas of redness and/or skin breakdown   Assess vascular access sites hourly     Problem: Safety - Adult  Goal: Free from fall injury  Outcome: Progressing  Flowsheets (Taken 2/24/2025 2020)  Free From Fall Injury: Instruct family/caregiver on patient safety     Problem: Chronic Conditions and Co-morbidities  Goal: Patient's chronic conditions and co-morbidity symptoms are monitored and maintained or improved  Outcome: Progressing  Flowsheets (Taken 2/24/2025 2020)  Care Plan - Patient's Chronic Conditions and Co-Morbidity Symptoms are Monitored and Maintained or Improved:   Monitor and assess patient's

## 2025-02-26 VITALS
SYSTOLIC BLOOD PRESSURE: 102 MMHG | BODY MASS INDEX: 29.64 KG/M2 | TEMPERATURE: 97.9 F | WEIGHT: 167.3 LBS | RESPIRATION RATE: 16 BRPM | OXYGEN SATURATION: 93 % | DIASTOLIC BLOOD PRESSURE: 70 MMHG | HEART RATE: 60 BPM

## 2025-02-26 LAB
ANION GAP SERPL CALC-SCNC: 10 MMOL/L (ref 7–16)
BUN SERPL-MCNC: 14 MG/DL (ref 8–23)
CALCIUM SERPL-MCNC: 8.2 MG/DL (ref 8.8–10.2)
CHLORIDE SERPL-SCNC: 103 MMOL/L (ref 98–107)
CO2 SERPL-SCNC: 24 MMOL/L (ref 20–29)
CREAT SERPL-MCNC: 0.5 MG/DL (ref 0.6–1.1)
ERYTHROCYTE [DISTWIDTH] IN BLOOD BY AUTOMATED COUNT: 15.5 % (ref 11.9–14.6)
GLUCOSE SERPL-MCNC: 103 MG/DL (ref 70–99)
HCT VFR BLD AUTO: 37.3 % (ref 35.8–46.3)
HGB BLD-MCNC: 11.9 G/DL (ref 11.7–15.4)
MAGNESIUM SERPL-MCNC: 2 MG/DL (ref 1.8–2.4)
MCH RBC QN AUTO: 29.7 PG (ref 26.1–32.9)
MCHC RBC AUTO-ENTMCNC: 31.9 G/DL (ref 31.4–35)
MCV RBC AUTO: 93 FL (ref 82–102)
NRBC # BLD: 0 K/UL (ref 0–0.2)
PLATELET # BLD AUTO: 148 K/UL (ref 150–450)
PMV BLD AUTO: 11.7 FL (ref 9.4–12.3)
POTASSIUM SERPL-SCNC: 4.2 MMOL/L (ref 3.5–5.1)
RBC # BLD AUTO: 4.01 M/UL (ref 4.05–5.2)
SODIUM SERPL-SCNC: 137 MMOL/L (ref 136–145)
T4 FREE SERPL-MCNC: 1.1 NG/DL (ref 0.9–1.7)
TSH W FREE THYROID IF ABNORMAL: 8.59 UIU/ML (ref 0.27–4.2)
WBC # BLD AUTO: 6.8 K/UL (ref 4.3–11.1)

## 2025-02-26 PROCEDURE — 97530 THERAPEUTIC ACTIVITIES: CPT

## 2025-02-26 PROCEDURE — 2500000003 HC RX 250 WO HCPCS: Performed by: INTERNAL MEDICINE

## 2025-02-26 PROCEDURE — 83735 ASSAY OF MAGNESIUM: CPT

## 2025-02-26 PROCEDURE — 6370000000 HC RX 637 (ALT 250 FOR IP): Performed by: INTERNAL MEDICINE

## 2025-02-26 PROCEDURE — 97165 OT EVAL LOW COMPLEX 30 MIN: CPT

## 2025-02-26 PROCEDURE — 85027 COMPLETE CBC AUTOMATED: CPT

## 2025-02-26 PROCEDURE — 36415 COLL VENOUS BLD VENIPUNCTURE: CPT

## 2025-02-26 PROCEDURE — 84443 ASSAY THYROID STIM HORMONE: CPT

## 2025-02-26 PROCEDURE — 80048 BASIC METABOLIC PNL TOTAL CA: CPT

## 2025-02-26 PROCEDURE — 84439 ASSAY OF FREE THYROXINE: CPT

## 2025-02-26 PROCEDURE — 97535 SELF CARE MNGMENT TRAINING: CPT

## 2025-02-26 RX ORDER — POTASSIUM CHLORIDE 750 MG/1
10 CAPSULE, EXTENDED RELEASE ORAL DAILY
COMMUNITY

## 2025-02-26 RX ORDER — FUROSEMIDE 40 MG/1
40 TABLET ORAL DAILY PRN
COMMUNITY

## 2025-02-26 RX ADMIN — MEMANTINE 5 MG: 5 TABLET ORAL at 09:09

## 2025-02-26 RX ADMIN — ATORVASTATIN CALCIUM 40 MG: 40 TABLET, FILM COATED ORAL at 09:09

## 2025-02-26 RX ADMIN — SODIUM CHLORIDE, PRESERVATIVE FREE 10 ML: 5 INJECTION INTRAVENOUS at 09:09

## 2025-02-26 RX ADMIN — ACETAMINOPHEN 650 MG: 325 TABLET ORAL at 06:20

## 2025-02-26 RX ADMIN — ESCITALOPRAM OXALATE 10 MG: 10 TABLET ORAL at 09:09

## 2025-02-26 RX ADMIN — LEVOTHYROXINE SODIUM 88 MCG: 0.09 TABLET ORAL at 06:20

## 2025-02-26 ASSESSMENT — PAIN SCALES - GENERAL: PAINLEVEL_OUTOF10: 0

## 2025-02-26 NOTE — CARE COORDINATION
Patient did not discharge on 2/25 due to a dizzy spell when up. Discharge order placed today. CM met with patient who verbalizes agreement to discharge. Patient reports her daughter, who whom she lives with, is transporting her home by car.

## 2025-02-26 NOTE — DISCHARGE INSTRUCTIONS
DEVICE IMPLANT FOLLOWUP INSTRUCTIONS  You will be discharged with an occlusive dressing over the incision site. This dressing will be removed at the 10 -14-day postop site check with the [facility]. Your new device will be checked at that visit and all your device teaching will be given.   Keep the incision site dry until your follow up. Use a hand-held shower or bath.   Do not submerge or soak in pools or tubs for 6 weeks. If you are discharged with a prescription for antibiotics, please take the full prescription until it is gone.  After your site check, you may shower and get the incision site wet. You may let soap and water run on the incision and pat dry. Please do not apply creams, lotions or powders on or near the incision.   Mild bruising and swelling can be normal after implant and will resolve in a few weeks.     Call the office at [phone] if you have any of the following:  -Signs of infection, such as fever over 100 F, drainage from the incision, redness, significant swelling, or warmth at the incision site.  -Significant pain around the site that gets worse. Mild discomfort can be normal.   -Bleeding from the incision site  -Swelling in the arm on the side of the incision site  -Chest pain or shortness of breath     Your device has the capability of transmitting device information from home to our office using a home monitoring system or phone marilynn. The information will transmit through a cellular connection outside of your home. Your device data is reviewed during working hours to ensure proper device function. You will be given your equipment and instruction upon your hospital discharge.                                                                       -You will be given a sling to wear upon discharge with instructions when it should be worn.   -Do not lift the affected arm above

## 2025-02-26 NOTE — PLAN OF CARE
Problem: Discharge Planning  Goal: Discharge to home or other facility with appropriate resources  Outcome: Adequate for Discharge  Flowsheets (Taken 2/25/2025 2029 by Alec Welch, RN)  Discharge to home or other facility with appropriate resources: Identify barriers to discharge with patient and caregiver     Problem: Pain  Goal: Verbalizes/displays adequate comfort level or baseline comfort level  Outcome: Adequate for Discharge     Problem: Skin/Tissue Integrity  Goal: Skin integrity remains intact  Description: 1.  Monitor for areas of redness and/or skin breakdown  2.  Assess vascular access sites hourly  3.  Every 4-6 hours minimum:  Change oxygen saturation probe site  4.  Every 4-6 hours:  If on nasal continuous positive airway pressure, respiratory therapy assess nares and determine need for appliance change or resting period  Outcome: Adequate for Discharge  Flowsheets (Taken 2/25/2025 2029 by Alec Welch, RN)  Skin Integrity Remains Intact: Monitor for areas of redness and/or skin breakdown     Problem: Safety - Adult  Goal: Free from fall injury  Outcome: Adequate for Discharge  Flowsheets (Taken 2/25/2025 2029 by Alec Welch, RN)  Free From Fall Injury: Instruct family/caregiver on patient safety     Problem: Chronic Conditions and Co-morbidities  Goal: Patient's chronic conditions and co-morbidity symptoms are monitored and maintained or improved  Outcome: Adequate for Discharge  Flowsheets (Taken 2/25/2025 2029 by Alec Welch, RN)  Care Plan - Patient's Chronic Conditions and Co-Morbidity Symptoms are Monitored and Maintained or Improved: Monitor and assess patient's chronic conditions and comorbid symptoms for stability, deterioration, or improvement        Problem: Pain  Goal: #Acceptable pain level achieved/maintained at rest using NRS/Faces  Description: This goal is used for patients who can self-report.  Acceptable means the level is at or below the identified comfort/function goal.  Outcome: Outcome Not Met, Continue to Monitor  Goal: # Acceptable pain level achieved/maintained at rest using NRS/Faces without oversedation (opioid naive or PCA/Epidural infusion)  Description: This goal is used if Opioid-naïve or on PCA/Epidural Infusion.  Outcome: Outcome Not Met, Continue to Monitor  Goal: # Acceptable pain level achieved/maintained with activity using NRS/Faces  Description: This goal is used for patients who can self-report and are not achieving acceptable pain control during activity.  Outcome: Outcome Not Met, Continue to Monitor  Goal: Acceptable pain/comfort level is achieved/maintained at rest (based on Pain Behaviors Scale)  Description: This goal is used for patients who are not able to self-report pain and are assessed for pain using the Pain Behaviors Scale  Outcome: Outcome Not Met, Continue to Monitor  Goal: Acceptable pain/comfort level is achieved/maintained at rest based on PAINAID scale (Dementia)  Description: This goal is used for patients who are not able to self-report pain, have dementia, and assessed using the PAINAD scale.  Outcome: Outcome Not Met, Continue to Monitor  Goal: Acceptable pain/comfort level is achieved/maintained at rest (based on pediatric behavior tool: NIPS, NPASS, or FLACC)  Description: This goal is used for pediatric patients who are not able to self report pain.  Outcome: Outcome Not Met, Continue to Monitor  Goal: # Verbalizes understanding of pain management  Description: Documented in Patient Education Activity  Outcome: Outcome Not Met, Continue to Monitor  Goal: Verbalizes understanding and effective use of Patient Controlled Analgesia (PCA)  Description: Documented in Patient Education Activity  This goal is  used for patients with PCA  Outcome: Outcome Not Met, Continue to Monitor  Goal: Maximum comfort achieved/maintained at end of life (Hospice)  Outcome: Outcome Not Met, Continue to Monitor     Problem: Nausea/Vomiting  Goal: Maintains oral intake with decreased/no reports of nausea/vomiting  Outcome: Outcome Not Met, Continue to Monitor  Goal: Current weight maintained or increased  Outcome: Outcome Not Met, Continue to Monitor  Goal: Verbalizes understanding of s/s and strategies to control nausea/vomiting  Description: Document education using the patient education activity.   Outcome: Outcome Not Met, Continue to Monitor  Goal: Decreased reports of nausea/vomiting (Hospice)  Outcome: Outcome Not Met, Continue to Monitor     Problem: At Risk for Falls  Goal: # Patient does not fall  Outcome: Outcome Not Met, Continue to Monitor  Goal: # Takes action to control fall-related risks  Outcome: Outcome Not Met, Continue to Monitor  Goal: # Verbalizes understanding of fall risk/precautions  Description: Document education using the patient education activity  Outcome: Outcome Not Met, Continue to Monitor     Problem: At Risk for Injury Due to Fall  Goal: # Patient does not fall  Outcome: Outcome Not Met, Continue to Monitor  Goal: # Takes action to control condition specific risks  Outcome: Outcome Not Met, Continue to Monitor  Goal: # Verbalizes understanding of fall-related injury personal risks  Description: Document education using the patient education activity  Outcome: Outcome Not Met, Continue to Monitor     Problem: Cardiac Surgery  Goal: Hemodynamic stability achieved/maintained  Description: Hemodynamic instability may include VS or rhythm changes or s/s FVE.  AHA guidelines: Keep BP>90 mm Hg.  Outcome: Outcome Not Met, Continue to Monitor  Goal: Extubation criteria met (Goal within 6 hours post-op)  Description: Choose this goal for [facility] patients only  Outcome: Outcome Not Met, Continue to  Monitor  Goal: Extubation criteria met (Goal within 24 hours post-op)  Outcome: Outcome Not Met, Continue to Monitor  Goal: Neurological status returned to baseline  Outcome: Outcome Not Met, Continue to Monitor  Goal: Elimination status is maintained/returned to baseline  Outcome: Outcome Not Met, Continue to Monitor  Goal: Activity level is maintained/returned to level needed for d/c  Outcome: Outcome Not Met, Continue to Monitor  Goal: Verbalizes/demonstrates understanding of heart disease, risk factors, treatment plan, and d/c instructions  Description: Document on Patient Education Activity   Outcome: Outcome Not Met, Continue to Monitor

## 2025-02-26 NOTE — PROGRESS NOTES
[facility] CARDIOLOGY PROGRESS NOTE           2/25/2025 6:43 AM    Admit Date: 2/21/2025    Admit Diagnosis: Syncope and collapse [R55]      Subjective:   No complaints this AM, no chest pain or shortness of breath, appropriate post op device pocket pain    Interval History: (History of pertinent interval events obtained from nursing staff)  Cardiac device placed yesterday, no events overnight, no immediate complications    ROS:  GEN:  No fever or chills  Cardiovascular:  As noted above  Pulmonary:  As noted above  Neuro:  No new focal motor or sensory loss      Objective:     Vitals:    02/25/25 0027 02/25/25 0100 02/25/25 0245 02/25/25 0439   BP: 109/69 109/70 97/65 100/72   Pulse: 67 65 63 67   Resp: 18   16   Temp: 97.7 °F (36.5 °C)   97.3 °F (36.3 °C)   TempSrc: Oral   Oral   SpO2:       Weight:    78.2 kg (172 lb 6.4 oz)       Physical Exam:  General-Well Developed, Well Nourished, No Acute Distress, Alert & Oriented x 3, appropriate mood.  CV- regular rate and rhythm no MRG, left infraclavicular pocket with dressing in place, no evidence of hematoma, redness, warmth or excessive drainage  Lung- clear bilaterally  Abd- soft, nontender, nondistended  Ext- no edema bilaterally.    Current Facility-Administered Medications   Medication Dose Route Frequency    acetaminophen (TYLENOL) tablet 650 mg  650 mg Oral Q6H    oxyCODONE (ROXICODONE) immediate release tablet 5 mg  5 mg Oral Q4H PRN    DOBUTamine (DOBUTREX) 500 mg in dextrose 5 % 250 mL infusion  2.5-10 mcg/kg/min IntraVENous Continuous    memantine (NAMENDA) tablet 5 mg  5 mg Oral Daily    polyvinyl alcohol (LIQUIFILM TEARS) 1.4 % ophthalmic solution 1 drop  1 drop Both Eyes PRN    potassium chloride (KLOR-CON M) extended release tablet 10 mEq  10 mEq Oral Daily    atorvastatin (LIPITOR) tablet 40 mg  40 mg Oral Daily    donepezil (ARICEPT) tablet 10 mg  10 mg Oral Nightly    escitalopram (LEXAPRO) tablet 10 mg  10 mg Oral Daily    
                       [facility] CARDIOLOGY PROGRESS NOTE           2/26/2025 7:16 AM    Admit Date: 2/21/2025    Admit Diagnosis: Syncope and collapse [R55]    Assessment:   Principal Problem:    Bradycardia  Active Problems:    SAH (subarachnoid hemorrhage) (HCC)    Chronic systolic (congestive) heart failure (HCC)    Essential hypertension    Permanent atrial fibrillation (HCC)    Syncope and collapse  Resolved Problems:    * No resolved hospital problems. *      Plan:   Symptomatic bradycardia - s/p CRT-P. Stable wound, device interrogation and CXR.  Permanent AF - stable rate control.   Stroke ppx - off OAC 2/2 fall and ICH. Previously on Xarelto.   SAH - s/p GLF resulting in ICH. Per neuro/medicine.   Dispo - ongoing inpt care.    Patient is being seen today within a 90-day global period, but is being seen for a separately identifiable E/M service and is not related to the post-operative care of the procedure.     Thank you for allowing me to participate in the electrophysiologic care of this most pleasant patient. Please feel free to contact me if there are any questions or concerns.    Lukas Montes MD, MS  Clinical Cardiac Electrophysiology  [facility]    Subjective:   No complaints this AM, no chest pain or shortness of breath    Interval History: (History of pertinent interval events obtained from nursing staff)    ROS:  GEN:  No fever or chills  Cardiovascular:  As noted above  Pulmonary:  As noted above  Neuro:  No new focal motor or sensory loss      Objective:     Vitals:    02/25/25 1640 02/25/25 2011 02/26/25 0011 02/26/25 0356   BP: 98/61 108/75 99/68 116/81   Pulse: 74 67 66 63   Resp: 16 18 16 16   Temp: 98.2 °F (36.8 °C) 98.1 °F (36.7 °C) 97.5 °F (36.4 °C) 97.3 °F (36.3 °C)   TempSrc: Oral Oral Oral Axillary   SpO2: 95%  94% 97%   Weight:    75.9 kg (167 lb 4.8 oz)       Physical Exam:  General-Well Developed, Well Nourished, No Acute Distress, Alert & Oriented x 3, appropriate 
                   [facility] CARDIOLOGY PROGRESS NOTE           2/23/2025 10:36 AM    Admit Date: 2/21/2025      Subjective:       Review of Systems        Objective:      Vitals:    02/23/25 0500 02/23/25 0530 02/23/25 0600 02/23/25 0800   BP: (!) 125/57 (!) 116/59 122/61    Pulse: 61 65 64    Resp: 21 28 12    Temp:    97.8 °F (36.6 °C)   TempSrc:    Axillary   SpO2: 95% 93% 94%    Weight:   82.1 kg (181 lb)          Physical Exam  Vitals reviewed.   Constitutional:       Appearance: She is underweight. She is ill-appearing.   HENT:      Head: Normocephalic.      Right Ear: External ear normal.      Left Ear: External ear normal.      Nose: Nose normal.   Eyes:      General: No scleral icterus.  Cardiovascular:      Rate and Rhythm: Rhythm irregular.   Pulmonary:      Effort: Pulmonary effort is normal.   Abdominal:      General: There is no distension.   Musculoskeletal:      Cervical back: Neck supple.   Skin:     General: Skin is warm.   Neurological:      Mental Status: She is lethargic.      Motor: Weakness present.         Data Review:   Recent Labs     02/22/25  0350 02/23/25  0428    139   K 3.7 3.8   BUN 7* 8   WBC 5.2  --    HGB 10.7*  --    HCT 32.9*  --      --        [unfilled]  Current Facility-Administered Medications   Medication Dose Route Frequency    DOBUTamine (DOBUTREX) 500 mg in dextrose 5 % 250 mL infusion  2.5-10 mcg/kg/min IntraVENous Continuous    memantine (NAMENDA) tablet 5 mg  5 mg Oral Daily    polyvinyl alcohol (LIQUIFILM TEARS) 1.4 % ophthalmic solution 1 drop  1 drop Both Eyes PRN    potassium chloride (KLOR-CON M) extended release tablet 10 mEq  10 mEq Oral Daily    acetaminophen (TYLENOL) tablet 500 mg  500 mg Oral Q6H PRN    atorvastatin (LIPITOR) tablet 40 mg  40 mg Oral Daily    donepezil (ARICEPT) tablet 10 mg  10 mg Oral Nightly    escitalopram (LEXAPRO) tablet 10 mg  10 mg Oral Daily    levothyroxine (SYNTHROID) tablet 88 mcg  88 mcg Oral QAM AC    
               Josefina Young  Admission Date: 2/21/2025         Daily Progress Note: 2/25/2025    The patient's chart is reviewed and the patient is discussed with the staff.    Background: 79 y.o. female with with a history of atrial fibs/bradycardia on Xarelto, hypertension, hyperlipidemia, hypothyroidism, GERD, and dementia who is transferred from hospital at [place] for possible pacemaker placement.  Patient had an episode Wednesday 2 days ago while she was in the kitchen with her daughter.  She developed abdominal pain, nausea and vomiting and her eyes subsequently rolled back and she became unresponsive.  EMS was contacted and patient was brought to the ED at [place].  Part of the workup included a CT of the head which showed small volume of acute appearing hyperdense subarachnoid hemorrhage along the left parietal lobe.  The on-call neurologist evaluated CT and a repeat CT of the head was done 4 hours later.  No progression of the area of bleeding was noted.  Patient's Xarelto was held.  MRI of the brain was done showing a small volume left posterior part parietal lobe subarachnoid hemorrhage and vasogenic edema.  Patient was given Keppra twice a day because of possible seizure.     While the patient was monitored in the ICU she was noted to have persistent bradycardia with heart rates dipping low 35.  She did receive some atropine.  She subsequently was placed on dobutamine.  Previously cardiology had recommended pacemaker and echo was done which showed ejection fraction 45%.    Subjective:     Sitting up in bed, eating in breakfast. On room air. Having some pain at PPM site.     Current Facility-Administered Medications   Medication Dose Route Frequency    acetaminophen (TYLENOL) tablet 650 mg  650 mg Oral Q6H    oxyCODONE (ROXICODONE) immediate release tablet 5 mg  5 mg Oral Q4H PRN    DOBUTamine (DOBUTREX) 500 mg in dextrose 5 % 250 mL infusion  2.5-10 mcg/kg/min IntraVENous Continuous    memantine 
  [facility] Critical Care Note:: 2/24/2025  Josefina Young  Admission Date: 2/21/2025     Length of Stay: 3 days    Background: 79 y.o. female with with a history of atrial fibs/bradycardia on Xarelto, hypertension, hyperlipidemia, hypothyroidism, GERD, and dementia who is transferred from hospital at [place] for possible pacemaker placement.  Patient had an episode Wednesday 2 days ago while she was in the kitchen with her daughter.  She developed abdominal pain nausea and vomiting and her eyes subsequently rolled back and she became unresponsive.  EMS was contacted and patient was brought to the ED at [place].  Part of the workup included a CT of the head which showed small volume of acute appearing hyperdense subarachnoid hemorrhage along the left parietal lobe.  The on-call neurologist evaluated CT and a repeat CT of the head was done 4 hours later.  No progression of the area of bleeding was noted.  Patient's Xarelto was held.  MRI of the brain was done showing a small volume left posterior part parietal lobe subarachnoid hemorrhage and vasogenic edema.  Patient was given Keppra twice a day because of possible seizure.     While the patient was monitored in the ICU she was noted to have persistent bradycardia with heart rates dipping low 35.  She did receive some atropine.  She subsequently was placed on dobutamine.  Previously cardiology had recommended pacemaker and echo was done which showed ejection fraction 45%.    Notable PMH:  has a past medical history of Arthritis, Asthma, Chest pain, Disorder of thyroid, Essential hypertension, GERD (gastroesophageal reflux disease), Hypercholesterolemia, Hyperlipidemia, Hypothyroidism, Permanent atrial fibrillation (HCC), and Syncope.    24 Hour events:   Going to cath lab today. Awake and alert. On RA. No dypsnea.     Review of Systems: Comprehensive ROS negative except in HPI    Lines:    External Urinary Catheter (Active)      Drips: current dose 
  [facility] Critical Care Note:: 2/23/2025  Josefina Young  Admission Date: 2/21/2025     Length of Stay: 2 days    Background: 79 y.o. female with with a history of atrial fibs/bradycardia on Xarelto, hypertension, hyperlipidemia, hypothyroidism, GERD, and dementia who is transferred from hospital at [place] for possible pacemaker placement.  Patient had an episode Wednesday 2 days ago while she was in the kitchen with her daughter.  She developed abdominal pain nausea and vomiting and her eyes subsequently rolled back and she became unresponsive.  EMS was contacted and patient was brought to the ED at [place].  Part of the workup included a CT of the head which showed small volume of acute appearing hyperdense subarachnoid hemorrhage along the left parietal lobe.  The on-call neurologist evaluated CT and a repeat CT of the head was done 4 hours later.  No progression of the area of bleeding was noted.  Patient's Xarelto was held.  MRI of the brain was done showing a small volume left posterior part parietal lobe subarachnoid hemorrhage and vasogenic edema.  Patient was given Keppra twice a day because of possible seizure.     While the patient was monitored in the ICU she was noted to have persistent bradycardia with heart rates dipping low 35.  She did receive some atropine.  She subsequently was placed on dobutamine.  Previously cardiology had recommended pacemaker and echo was done which showed ejection fraction 45%.    Notable PMH:  has a past medical history of Arthritis, Asthma, Chest pain, Disorder of thyroid, Essential hypertension, GERD (gastroesophageal reflux disease), Hypercholesterolemia, Hyperlipidemia, Hypothyroidism, Permanent atrial fibrillation (HCC), and Syncope.    24 Hour events:   Pt remains on dobutamine,     Review of Systems: Comprehensive ROS negative except in HPI    Lines:    External Urinary Catheter (Active)      Drips: current dose (range)  Dose (mcg/kg/min) Dobutamine: 
ACUTE OCCUPATIONAL THERAPY GOALS:   (Developed with and agreed upon by patient and/or caregiver.)  1. Patient will complete lower body bathing and dressing with MOD I and adaptive equipment as needed.   2.Patient will complete upper body bathing and dressing with MOD I and adaptive equipment as needed.  3. Patient will complete toileting with MOD I.   4. Patient will tolerate 30 minutes of OT treatment with 1-2 rest breaks to increase activity tolerance for ADLs.   5. Patient will complete functional transfers with MOD I and adaptive equipment as needed.   6. Patient will complete functional activity with MOD I and adaptive equipment as needed.  7. Patient will complete simple grooming task standing at the sink with MOD I.    Timeframe: 7 visits      OCCUPATIONAL THERAPY Initial Assessment and Daily Note       OT Visit Days: 1  Acknowledge Orders  Time  OT Charge Capture  Rehab Caseload Tracker      Josefina Young is a 79 y.o. female   PRIMARY DIAGNOSIS: Bradycardia  Syncope and collapse [R55]  Procedure(s) (LRB):  Lv lead placement (N/A)  Insert PPM single ventricular (N/A)  2 Days Post-Op  Reason for Referral: Generalized Muscle Weakness (M62.81)  Other lack of cordination (R27.8)  Difficulty in walking, Not elsewhere classified (R26.2)  Inpatient: Payor: MEDICARE / Plan: MEDICARE PART A AND B / Product Type: *No Product type* /     ASSESSMENT:     REHAB RECOMMENDATIONS:   Recommendation to date pending progress:  Setting:  Home Health Therapy    Equipment:    To Be Determined     ASSESSMENT:  Ms. Young presented to the hospital with bradycardia--s/p pacemaker placement on 2/24. At baseline, pt is mod I for bADLs and mobility. Lives with daughter who can assist as needed.   Today, pt was received supine in bed. Completed bed mobility, LB/UB dressing, functional transfers, and ambulation with rolling walker with overall CGA. Educated on pacemaker precautions and implications on mobility/bADLs--pt demonstrated 
ACUTE PHYSICAL THERAPY GOALS:   (Developed with and agreed upon by patient and/or caregiver.)      (1.) Josefina Young  will move from supine to sit and sit to supine , scoot up and down, and roll side to side with INDEPENDENT within 7 treatment day(s).    (2.) Josefina Young will transfer from bed to chair and chair to bed with MODIFIED INDEPENDENCE using the least restrictive device within 7 treatment day(s).    (3.) Josefina Young will ambulate with MODIFIED INDEPENDENCE for 150 feet with the least restrictive device within 7 treatment day(s).   (4.) Josefina Young will perform standing static and dynamic balance activities x 5 minutes with MODIFIED INDEPENDENCE to improve safety within 7 treatment day(s).  (5.) Josefina Young will ascend and descend 6 stairs using 2 hand rail(s) with MODIFIED INDEPENDENCE to improve functional mobility and safety within 7 treatment day(s).  (6.) Josefina Young will perform therapeutic exercises x 10 min for HEP with MODIFIED INDEPENDENCE to improve strength, endurance, and functional mobility within 7 treatment day(s).       PHYSICAL THERAPY Initial Assessment, Daily Note, and PM  (Link to Caseload Tracking: PT Visit Days : 1  Acknowledge Orders  Time In/Out  PT Charge Capture  Rehab Caseload Tracker    Josefina Young is a 79 y.o. female   PRIMARY DIAGNOSIS: Bradycardia  Syncope and collapse [R55]  Procedure(s) (LRB):  Lv lead placement (N/A)  Insert PPM single ventricular (N/A)  1 Day Post-Op  Reason for Referral: Generalized Muscle Weakness (M62.81)  Difficulty in walking, Not elsewhere classified (R26.2)  Inpatient: Payor: MEDICARE / Plan: MEDICARE PART A AND B / Product Type: *No Product type* /     ASSESSMENT:     REHAB RECOMMENDATIONS:   Recommendation to date pending progress:  Setting:  Home Health Therapy  Family assistance    Equipment:    To Be Determined     ASSESSMENT:  Ms. Young is a 79 year old female admitted to the hospital on 2/21/25 following syncopal episode and 
ACUTE PHYSICAL THERAPY GOALS:   (Developed with and agreed upon by patient and/or caregiver.)  (1.) Josefina Young  will move from supine to sit and sit to supine , scoot up and down, and roll side to side with INDEPENDENT within 7 treatment day(s).    (2.) Josefina Young will transfer from bed to chair and chair to bed with MODIFIED INDEPENDENCE using the least restrictive device within 7 treatment day(s).    (3.) Josefina Young will ambulate with MODIFIED INDEPENDENCE for 150 feet with the least restrictive device within 7 treatment day(s).   (4.) Josefina Young will perform standing static and dynamic balance activities x 5 minutes with MODIFIED INDEPENDENCE to improve safety within 7 treatment day(s).  (5.) Josefina Young will ascend and descend 6 stairs using 2 hand rail(s) with MODIFIED INDEPENDENCE to improve functional mobility and safety within 7 treatment day(s).  (6.) Josefina Young will perform therapeutic exercises x 10 min for HEP with MODIFIED INDEPENDENCE to improve strength, endurance, and functional mobility within 7 treatment day(s).     PHYSICAL THERAPY: Daily Note AM   (Link to Caseload Tracking: PT Visit Days : 2  Time In/Out PT Charge Capture  Rehab Caseload Tracker  Orders    Josefina Young is a 79 y.o. female   PRIMARY DIAGNOSIS: Bradycardia  Syncope and collapse [R55]  Procedure(s) (LRB):  Lv lead placement (N/A)  Insert PPM single ventricular (N/A)  2 Days Post-Op  Inpatient: Payor: MEDICARE / Plan: MEDICARE PART A AND B / Product Type: *No Product type* /     ASSESSMENT:     REHAB RECOMMENDATIONS:   Recommendation to date pending progress:  Setting:  Home Health Therapy    Equipment:    To Be Determined     ASSESSMENT:  Ms. Young was supine upon contact and agreeable to PT. Patient performed supine to sit with CGA and cues for technique/pacemaker precautions. Patient demonstrated good static sitting balance and fair+ dynamic sitting balance during functional tasks seated EOB. Patient 
Patient feeling \"woozey\" when working with PT. Vitals stable. Okay to discontinue discharge orders for now per MD Lex.  
Patient stood and ambulated around room.  Assisted to bathroom.  Returned to bed.  Patient denies any dizziness or lightheadedness.    
Spiritual Health History and Assessment/Progress Note  [facility]    (P) Spiritual/Emotional Needs,  ,  ,      Name: Josefina Young MRN: [id number]    Age: 79 y.o.     Sex: female   Language: English   Adventist: Yarsanism   Bradycardia     Date: 2/25/2025            Total Time Calculated: (P) 10 min              Spiritual Assessment began in [facility] TELEMETRY        Referral/Consult From: (P) Rounding   Encounter Overview/Reason: (P) Spiritual/Emotional Needs  Service Provided For: (P) Patient and family together    Gloria, Belief, Meaning:   Patient identifies as spiritual, is connected with a gloria tradition or spiritual practice, and has beliefs or practices that help with coping during difficult times  Family/Friends identify as spiritual, are connected with a gloria tradition or spiritual practice, and have beliefs or practices that help with coping during difficult times      Importance and Influence:  Patient has no beliefs influential to healthcare decision-making identified during this visit  Family/Friends have no beliefs influential to healthcare decision-making identified during this visit    Community:  Patient is connected with a spiritual community and feels well-supported. Support system includes: Children and Friends  Family/Friends are connected with a spiritual community: and feel well-supported. Support system includes: Children and Friends    Assessment and Plan of Care:     Patient Interventions include: Facilitated expression of thoughts and feelings, Affirmed coping skills/support systems, and Provided sacramental/Tenriism ritual  Family/Friends Interventions include: Facilitated expression of thoughts and feelings, Affirmed coping skills/support systems, and Provided sacramental/Tenriism ritual    Patient Plan of Care: Spiritual Care available upon further referral  Family/Friends Plan of Care: Spiritual Care available upon further referral    Electronically signed by YOJANA SORENSEN on 
TRANSFER - IN REPORT:    Verbal report received from Fermin RN on Josefina S Young  being received from EP lab for routine progression of patient care      Report consisted of patient's Situation, Background, Assessment and   Recommendations(SBAR).     Information from the following report(s) Nurse Handoff Report was reviewed with the receiving nurse.    Opportunity for questions and clarification was provided.      Assessment completed upon patient's arrival to unit and care assumed.    
TRANSFER - IN REPORT:    Verbal report received from Michelet RODRIGUEZ on Josefina Young being received from cath lab () for routine progression of care.     Report consisted of patient’s Situation, Background, Assessment and Recommendations(SBAR).     Information from the following report(s) Procedure Summary, Recent Results, and Quality Measures was reviewed. Opportunity for questions and clarification was provided.      Assessment completed upon patient’s arrival to unit and care assumed.     Patient received to room 426. Patient connected to monitor and assessment completed. Plan of care reviewed. Patient oriented to room and call light. Patient aware to use call light to communicate any chest pain or needs.       
TRANSFER - IN REPORT:    Verbal report received from Rossana RN on Josefina Young  being received from [place] [facility] for change in patient condition (need for temp pacer)      Report consisted of patient's Situation, Background, Assessment and   Recommendations(SBAR).     Information from the following report(s) Nurse Handoff Report, Index, ED Encounter Summary, ED SBAR, Adult Overview, Intake/Output, MAR, Recent Results, Med Rec Status, Cardiac Rhythm a fib, Alarm Parameters, Procedure Verification, Quality Measures, and Neuro Assessment was reviewed with the receiving nurse.    Opportunity for questions and clarification was provided.      Assessment completed upon patient's arrival to unit and care assumed.     
TRANSFER - OUT REPORT:    Verbal report given to MICHAEL Masterson on Josefina Young  being transferred to [facility] for routine progression of patient care       Report consisted of patient's Situation, Background, Assessment and   Recommendations(SBAR).     Information from the following report(s) Nurse Handoff Report, Index, ED Encounter Summary, ED SBAR, Adult Overview, Surgery Report, Cardiac Rhythm A-Fib, Quality Measures, and Neuro Assessment was reviewed with the receiving nurse.           Lines:   Peripheral IV 02/22/25 Right;Anterior Forearm (Active)   Site Assessment Clean, dry & intact 02/23/25 0800   Line Status Capped;Flushed;Normal saline locked 02/23/25 0800   Line Care Connections checked and tightened 02/23/25 0800   Phlebitis Assessment No symptoms 02/23/25 0800   Infiltration Assessment 0 02/23/25 0800   Alcohol Cap Used Yes 02/23/25 0800   Dressing Status Clean, dry & intact 02/23/25 0800   Dressing Type Transparent 02/23/25 0800   Dressing Intervention New 02/22/25 0600       Peripheral IV 02/22/25 Left;Anterior Forearm (Active)   Site Assessment Clean, dry & intact 02/23/25 0800   Line Status Flushed;Normal saline locked 02/23/25 0800   Line Care Connections checked and tightened 02/23/25 0800   Phlebitis Assessment No symptoms 02/23/25 0800   Infiltration Assessment 0 02/23/25 0800   Alcohol Cap Used Yes 02/23/25 0800   Dressing Status Clean, dry & intact 02/23/25 0800   Dressing Type Transparent 02/23/25 0800   Dressing Intervention New 02/22/25 0600        Opportunity for questions and clarification was provided.      Patient transported with:  Tech       
TRANSFER - OUT REPORT:    Verbal report given to tele 426 on Josefina Young  being transferred to 426 for routine progression of patient care       Report consisted of patient's Situation, Background, Assessment and   Recommendations(SBAR).     Information from the following report(s) Nurse Handoff Report was reviewed with the receiving nurse.           Lines:   Peripheral IV 02/22/25 Right;Anterior Forearm (Active)   Site Assessment Clean, dry & intact 02/24/25 1215   Line Status Normal saline locked 02/24/25 1215   Line Care Connections checked and tightened 02/24/25 1215   Phlebitis Assessment No symptoms 02/24/25 1215   Infiltration Assessment 0 02/24/25 1215   Alcohol Cap Used Yes 02/24/25 1215   Dressing Status Clean, dry & intact 02/24/25 1215   Dressing Type Transparent 02/24/25 1215   Dressing Intervention New 02/22/25 0600        Opportunity for questions and clarification was provided.      Patient transported with:  Tech       
(range)  Dose (mcg/kg/min) Dobutamine: 5 mcg/kg/min     Pertinent Exam:         Blood pressure 128/60, pulse 85, temperature 98 °F (36.7 °C), temperature source Oral, resp. rate (!) 31, weight 81.7 kg (180 lb 1.9 oz), SpO2 94%.   Intake/Output Summary (Last 24 hours) at 2/22/2025 0844  Last data filed at 2/22/2025 0544  Gross per 24 hour   Intake 148.6 ml   Output 1250 ml   Net -1101.4 ml     Constitutional: nad  EENMT:  Sclera clear, pupils equal, oral mucosa moist  Respiratory: clear  Cardiovascular: rrr  Gastrointestinal:  soft with no tenderness; positive bowel sounds present  Musculoskeletal:  warm with no cyanosis, no lower extremity edema  Skin:  no jaundice or ecchymosis  Neurologic: alert  Psychiatric: calm    CXR:       Recent Labs     02/22/25  0350   WBC 5.2   HGB 10.7*   HCT 32.9*        Recent Labs     02/22/25  0350      K 3.7      CO2 26   GLUCOSE 89   BUN 7*   CREATININE 0.60   BILITOT 0.9   AST 16   ALT 8   ALKPHOS 91     No results for input(s): \"TROPHS\", \"NTPROBNP\", \"CRP\", \"ESR\" in the last 72 hours.  Recent Labs     02/22/25  0350   GLUCOSE 89      ECHO:   ECHO (TTE) COMPLETE (PRN CONTRAST/BUBBLE/STRAIN/3D) 10/16/2023    Interpretation Summary    Left Ventricle: Normal left ventricular systolic function. EF by 2D Simpsons Biplane is 60%. Left ventricle size is normal. Normal wall thickness. Normal wall motion.    Aortic Valve: Mild sclerosis of the aortic valve cusp. Mild regurgitation with a centrally directed jet.    Mitral Valve: Mild annular calcification of the mitral valve. Moderate regurgitation with a centrally directed jet.    Tricuspid Valve: Moderate regurgitation with a centrally directed jet. The estimated RVSP is 44 mmHg.    Left Atrium: Left atrium is moderately dilated.    Right Atrium: Right atrium is moderately dilated.    Antibiotics:  Inpat Anti-Infectives (From admission, onward)      None          Microbiology:   Results       ** No results found for the 
IntraVENous PRN    potassium chloride 20 mEq/50 mL IVPB (Central Line)  20 mEq IntraVENous PRN    Or    potassium chloride 10 mEq/100 mL IVPB (Peripheral Line)  10 mEq IntraVENous PRN    magnesium sulfate 2000 mg in 50 mL IVPB premix  2,000 mg IntraVENous PRN    ondansetron (ZOFRAN-ODT) disintegrating tablet 4 mg  4 mg Oral Q8H PRN    Or    ondansetron (ZOFRAN) injection 4 mg  4 mg IntraVENous Q6H PRN    polyethylene glycol (GLYCOLAX) packet 17 g  17 g Oral Daily PRN    acetaminophen (TYLENOL) tablet 650 mg  650 mg Oral Q6H PRN    Or    acetaminophen (TYLENOL) suppository 650 mg  650 mg Rectal Q6H PRN     Data Review:   Recent Results (from the past 24 hour(s))   Basic Metabolic Panel w/ Reflex to MG    Collection Time: 02/24/25  5:11 AM   Result Value Ref Range    Sodium 138 136 - 145 mmol/L    Potassium 3.8 3.5 - 5.1 mmol/L    Chloride 104 98 - 107 mmol/L    CO2 26 20 - 29 mmol/L    Anion Gap 8 7 - 16 mmol/L    Glucose 94 70 - 99 mg/dL    BUN 14 8 - 23 MG/DL    Creatinine 0.66 0.60 - 1.10 MG/DL    Est, Glom Filt Rate 88 >60 ml/min/1.73m2    Calcium 8.6 (L) 8.8 - 10.2 MG/DL       EKG:  (EKG has been independently visualized by me with interpretation below)  Assessment:     Principal Problem:    Bradycardia  Active Problems:    SAH (subarachnoid hemorrhage) (HCC)    Chronic systolic (congestive) heart failure (HCC)    Essential hypertension    Permanent atrial fibrillation (HCC)    Syncope and collapse  Resolved Problems:    * No resolved hospital problems. *    Plan:   Symptomatic bradycardia: I had a long discussion today with the patient regarding the risks, benefits, and alternatives of an implantable cardiac rhythm device.  I discussed in detail the potential benefits of pacemaker therapy that are largely aimed at improving symptoms from symptomatic bradycardia. Additionally, I discussed with the patient the potential risks device implantation, including the risk of bleeding, infection, large blood vessel 
sodium chloride flush 0.9 % injection 5-40 mL  5-40 mL IntraVENous PRN    0.9 % sodium chloride infusion   IntraVENous PRN    potassium chloride 20 mEq/50 mL IVPB (Central Line)  20 mEq IntraVENous PRN    Or    potassium chloride 10 mEq/100 mL IVPB (Peripheral Line)  10 mEq IntraVENous PRN    magnesium sulfate 2000 mg in 50 mL IVPB premix  2,000 mg IntraVENous PRN    ondansetron (ZOFRAN-ODT) disintegrating tablet 4 mg  4 mg Oral Q8H PRN    Or    ondansetron (ZOFRAN) injection 4 mg  4 mg IntraVENous Q6H PRN    polyethylene glycol (GLYCOLAX) packet 17 g  17 g Oral Daily PRN    acetaminophen (TYLENOL) tablet 650 mg  650 mg Oral Q6H PRN    Or    acetaminophen (TYLENOL) suppository 650 mg  650 mg Rectal Q6H PRN           Assessment/Plan:     Bradycardia  -Recent syncopal events with falls patient profoundly bradycardic and is now on dobutamine which has improved her heart rate.  -Device-based therapies are likely indicated EF mildly diminished on her most recent echocardiogram ideally this would be addressed with CRT-P.  However, due to patient's frailty age and other underlying medical conditions Micra device may be a reasonable option as well.  -Electrophysiology evaluation planned to determine candidacy for device-based therapies  -A limited echocardiogram will be repeated to ascertain her left ventricular systolic function    HFmrEF  -Beta-blocker currently on hold  -Plan to reinitiate appropriate ACE/ARB in the future however patient hypotensive today  -Her echocardiogram at 2023 showed normal left ventricular systolic function her most recent echo performed in [facility] occurred in the setting of acute illness and bradycardia and showed mildly diminished LV function  -Plan to repeat limited echo to assess her determine left ventricular systolic function as this may be relevant when device-based therapies are being considered for her bradycardia    Atrial fibrillation  -Xarelto on hold due to subarachnoid

## 2025-03-11 ENCOUNTER — NURSE ONLY (OUTPATIENT)
Age: 80
End: 2025-03-11

## 2025-03-11 DIAGNOSIS — R55 SYNCOPE: ICD-10-CM

## 2025-03-11 DIAGNOSIS — R55 SYNCOPE AND COLLAPSE: ICD-10-CM

## 2025-03-11 DIAGNOSIS — I50.22 CHRONIC SYSTOLIC (CONGESTIVE) HEART FAILURE (HCC): ICD-10-CM

## 2025-03-11 DIAGNOSIS — R00.1 BRADYCARDIA: ICD-10-CM

## 2025-03-11 DIAGNOSIS — I48.21 PERMANENT ATRIAL FIBRILLATION (HCC): Primary | ICD-10-CM

## 2025-05-08 ENCOUNTER — TELEPHONE (OUTPATIENT)
Age: 80
End: 2025-05-08

## 2025-05-08 NOTE — TELEPHONE ENCOUNTER
Pt's daughter calling - Pt went in [facility] on late 5/5 or early 5/6 for a stroke. Pt discharged 5/7. Neurologist called and indicated pt had 2 strokes and about 7 days apart. Pt has been off her blood pressure and blood thinners since February. Daughter stated that her blood pressure has been going up. Daughter stated pt had pacemaker put in Feb 2025 by Dr Saravia and it was found that pt had a brain bleed and stopped blood thinners. Daughter stated pt's has been dizzy and some blurred vision since the stroke.

## 2025-05-08 NOTE — TELEPHONE ENCOUNTER
Yoan Nelson DO Bennett, Sherry, RN  Caller: Unspecified (Today,  9:18 AM)  Looks like patient was taken off because of intracranial hemorrhage. Typically we would not put her back on the Xarelto. If neurology recommends she restart it then this discussion needs to happen between her primary cardiologist and neurology to ensure the best course of action is made for her. Blood pressure medication may need to be resumed. I would continue to monitor at home over the next week and if she is running consistently > 150 systolic would resume the losartan. Thank you.

## 2025-05-08 NOTE — TELEPHONE ENCOUNTER
Notified daughter of MD response. Daughter will follow up with Neurology. Daughter will monitor patient BP at home and keep a log. If BP remains elevated consistently greater than 150 systolic will resume Losartan. Daughter voices understanding of instructions given.

## 2025-05-08 NOTE — TELEPHONE ENCOUNTER
Patient daughter reports patient was recently admitted on 5/5/25. Patient found to have had 2 strokes 7 days apart. Patient had pacemaker placed in February and was taken off her Xarelto and Losartan. Hospital wanting patient to go back on Xarelto but family is unsure if patient should based on recent events. Family also concerned that patient blood pressure prior to stroke was elevated- 169/101. Yesterday /89. Should patient resume BP med. Will address with MD.

## 2025-06-12 ENCOUNTER — CLINICAL SUPPORT (OUTPATIENT)
Age: 80
End: 2025-06-12

## 2025-06-12 ENCOUNTER — OFFICE VISIT (OUTPATIENT)
Age: 80
End: 2025-06-12
Payer: MEDICARE

## 2025-06-12 VITALS
WEIGHT: 169 LBS | BODY MASS INDEX: 29.95 KG/M2 | DIASTOLIC BLOOD PRESSURE: 72 MMHG | HEART RATE: 70 BPM | HEIGHT: 63 IN | SYSTOLIC BLOOD PRESSURE: 112 MMHG

## 2025-06-12 DIAGNOSIS — R00.1 SYMPTOMATIC BRADYCARDIA: Primary | ICD-10-CM

## 2025-06-12 DIAGNOSIS — I48.21 PERMANENT ATRIAL FIBRILLATION (HCC): ICD-10-CM

## 2025-06-12 DIAGNOSIS — I50.22 CHRONIC SYSTOLIC (CONGESTIVE) HEART FAILURE (HCC): Primary | ICD-10-CM

## 2025-06-12 PROCEDURE — 3078F DIAST BP <80 MM HG: CPT | Performed by: PHYSICIAN ASSISTANT

## 2025-06-12 PROCEDURE — 1160F RVW MEDS BY RX/DR IN RCRD: CPT | Performed by: PHYSICIAN ASSISTANT

## 2025-06-12 PROCEDURE — 1036F TOBACCO NON-USER: CPT | Performed by: PHYSICIAN ASSISTANT

## 2025-06-12 PROCEDURE — 1123F ACP DISCUSS/DSCN MKR DOCD: CPT | Performed by: PHYSICIAN ASSISTANT

## 2025-06-12 PROCEDURE — 1126F AMNT PAIN NOTED NONE PRSNT: CPT | Performed by: PHYSICIAN ASSISTANT

## 2025-06-12 PROCEDURE — 3074F SYST BP LT 130 MM HG: CPT | Performed by: PHYSICIAN ASSISTANT

## 2025-06-12 PROCEDURE — G8400 PT W/DXA NO RESULTS DOC: HCPCS | Performed by: PHYSICIAN ASSISTANT

## 2025-06-12 PROCEDURE — 99214 OFFICE O/P EST MOD 30 MIN: CPT | Performed by: PHYSICIAN ASSISTANT

## 2025-06-12 PROCEDURE — G8417 CALC BMI ABV UP PARAM F/U: HCPCS | Performed by: PHYSICIAN ASSISTANT

## 2025-06-12 PROCEDURE — 1090F PRES/ABSN URINE INCON ASSESS: CPT | Performed by: PHYSICIAN ASSISTANT

## 2025-06-12 PROCEDURE — G8427 DOCREV CUR MEDS BY ELIG CLIN: HCPCS | Performed by: PHYSICIAN ASSISTANT

## 2025-06-12 PROCEDURE — 1159F MED LIST DOCD IN RCRD: CPT | Performed by: PHYSICIAN ASSISTANT

## 2025-06-12 RX ORDER — RIVAROXABAN 20 MG/1
20 TABLET, FILM COATED ORAL
COMMUNITY

## 2025-06-12 RX ORDER — TRAZODONE HYDROCHLORIDE 100 MG/1
100 TABLET ORAL NIGHTLY
COMMUNITY

## 2025-06-12 NOTE — PROGRESS NOTES
[facility], [address], [address]  [place] [address]  PHONE: [phone]    Josefina Young  1945    SUBJECTIVE:   Josefina Young is a 80 y.o. female seen for a follow up visit regarding the following:     Chief Complaint   Patient presents with    Bradycardia      HPI:    Josefina Young is a very pleasant 80 y.o. female with a past medical and cardiac history significant for permanent atrial fibrillation, HTN, vertigo, bradycardia on beta blocker, dementia  who was admitted after syncopal episode with fall, noted to have small SAH. She was bradycardiac with heart rates in 30's. She is now s/p successful implantation of [employer] biventricular permanent pacemaker 2/24/25. She was recently admitted to [facility] 5/5-5/7/25 with CVA. MRI showed small areas of acute cortical infarct posterior right frontal lobe and left occipital lobe. Seen by neurology inpatient with recommendations to resume Xarelto.     She presents today for EP follow up. She is feeling good. Denies any palpitation, chest pain or shortness of breath. Denies dizziness or lightheadedness. Neurology has mentioned Watchman Implant. Patient is interested.     Cardiac PMH: (Old records have been reviewed and summarized below)  TTE (10/16/23): EF60%, LA/RA moderately dilated, mod MR, mod TR, RVSP 44 mmHg    Monitor (12/5/23):   6 day 22 hour ambulatory ECG:  -Basic underlying rhythm is AF ( 100% burden) with and average HR of 72 bpm ( ).  -3 brief pauses occurred and ranged between 3.0 -3.3 seconds.  -Occasional PVC's ( 1.4%)    Limited TTE (2/22/25): EF 60%    Device (2/24/25): Successful implantation of [employer] biventricular permanent pacemaker     TTE (5/6/25) [facility]: EF 50-54%, LA severely dilated, RA moderately dilated    Reviewed office note Deleon  NP 6/5/25    Past Medical History, Past Surgical History, Family history, Social History, and Medications were all reviewed with the patient today and

## 2025-06-13 ENCOUNTER — TELEPHONE (OUTPATIENT)
Dept: CARDIAC CATH/INVASIVE PROCEDURES | Age: 80
End: 2025-06-13

## 2025-06-13 NOTE — TELEPHONE ENCOUNTER
[facility] Neurology contacted re: Josefina Young whose complex medical history is outlined below:     Admitted in Feb 2025 - symptomatic bradycardia/syncope/ fall w/ICH - S/p PPM   - was off OAC s/p fall and ICH  2/2025   - recently admitted to [facility] 5/7/25 with CVA - neuro consult who recommended to restart Xarelto and consider Watchman     Patient was seen for Watchman consult on 6/12/2025. She is currently on Xarelto 20 mg daily.  Is neurology Ok with heparin during the LAAO procedure?  Projected date of procedure is 8/4/2025.    [facility] with Nurse Navigator contact RIVERA Keller, RN, CCRN-K ([phone]) information provided.

## 2025-06-19 ENCOUNTER — TELEPHONE (OUTPATIENT)
Dept: CARDIAC CATH/INVASIVE PROCEDURES | Age: 80
End: 2025-06-19

## 2025-06-19 NOTE — TELEPHONE ENCOUNTER
Ms. Young will be scheduled for Watchman procedure on 8/4/2025.  Her daughter Jaimie will be contacted the week prior to procedure with an arrival time and additional instructions.      Last dose of donepezil will be 7/20/2025.  Xarelto to be HELD  for 24 hours on 8/3 and 8/4/2025 in preparation for procedure.  Daughter Jaimie Well verbalized understanding of instructions. Watchman coordinator contact (Alyssa Bonds -[phone]) information provided.

## 2025-06-30 ENCOUNTER — TELEPHONE (OUTPATIENT)
Dept: CARDIAC CATH/INVASIVE PROCEDURES | Age: 80
End: 2025-06-30

## 2025-06-30 NOTE — TELEPHONE ENCOUNTER
Patient's daughter Jaimie phoned with pre procedure appointment with Monica PEREIRA scheduled for 7/18/2025 at 11:30 am in the [facility] [place] office on [address], [address].  Patient/daughter have Watchman coordinator contact (Alyssa Bonds,-[phone]) information for questions or concerns.

## 2025-07-18 ENCOUNTER — OFFICE VISIT (OUTPATIENT)
Age: 80
End: 2025-07-18
Payer: MEDICARE

## 2025-07-18 VITALS
HEART RATE: 72 BPM | SYSTOLIC BLOOD PRESSURE: 128 MMHG | DIASTOLIC BLOOD PRESSURE: 68 MMHG | WEIGHT: 172 LBS | BODY MASS INDEX: 30.47 KG/M2

## 2025-07-18 DIAGNOSIS — I48.21 PERMANENT ATRIAL FIBRILLATION (HCC): Primary | ICD-10-CM

## 2025-07-18 PROCEDURE — 93000 ELECTROCARDIOGRAM COMPLETE: CPT | Performed by: PHYSICIAN ASSISTANT

## 2025-07-18 PROCEDURE — G8417 CALC BMI ABV UP PARAM F/U: HCPCS | Performed by: PHYSICIAN ASSISTANT

## 2025-07-18 PROCEDURE — 1159F MED LIST DOCD IN RCRD: CPT | Performed by: PHYSICIAN ASSISTANT

## 2025-07-18 PROCEDURE — 3078F DIAST BP <80 MM HG: CPT | Performed by: PHYSICIAN ASSISTANT

## 2025-07-18 PROCEDURE — 1090F PRES/ABSN URINE INCON ASSESS: CPT | Performed by: PHYSICIAN ASSISTANT

## 2025-07-18 PROCEDURE — 99214 OFFICE O/P EST MOD 30 MIN: CPT | Performed by: PHYSICIAN ASSISTANT

## 2025-07-18 PROCEDURE — G8400 PT W/DXA NO RESULTS DOC: HCPCS | Performed by: PHYSICIAN ASSISTANT

## 2025-07-18 PROCEDURE — 3074F SYST BP LT 130 MM HG: CPT | Performed by: PHYSICIAN ASSISTANT

## 2025-07-18 PROCEDURE — G8427 DOCREV CUR MEDS BY ELIG CLIN: HCPCS | Performed by: PHYSICIAN ASSISTANT

## 2025-07-18 PROCEDURE — 1123F ACP DISCUSS/DSCN MKR DOCD: CPT | Performed by: PHYSICIAN ASSISTANT

## 2025-07-18 PROCEDURE — 1036F TOBACCO NON-USER: CPT | Performed by: PHYSICIAN ASSISTANT

## 2025-07-18 PROCEDURE — 1160F RVW MEDS BY RX/DR IN RCRD: CPT | Performed by: PHYSICIAN ASSISTANT

## 2025-07-18 RX ORDER — DIVALPROEX SODIUM 250 MG/1
250 TABLET, FILM COATED, EXTENDED RELEASE ORAL DAILY
COMMUNITY
Start: 2025-07-16 | End: 2026-07-16

## 2025-07-18 RX ORDER — ROSUVASTATIN CALCIUM 20 MG/1
20 TABLET, COATED ORAL NIGHTLY
COMMUNITY
Start: 2025-06-16

## 2025-07-18 NOTE — PROGRESS NOTES
[facility], [address], [address]  [place] [address]  PHONE: [phone]    Josefina Young  1945    SUBJECTIVE:   Josefina Young is a 80 y.o. female seen for a follow up visit regarding the following:     Chief Complaint   Patient presents with    Atrial Fibrillation      HPI:    Josefina Young is a very pleasant 80 y.o. female with a past medical and cardiac history significant for permanent atrial fibrillation, HTN, vertigo, bradycardia on beta blocker, dementia  who was admitted after syncopal episode with fall, noted to have small SAH. She was bradycardiac with heart rates in 30's. She is now s/p successful implantation of [employer] biventricular permanent pacemaker 2/24/25. She was recently admitted to [facility] 5/5-5/7/25 with CVA. MRI showed small areas of acute cortical infarct posterior right frontal lobe and left occipital lobe. Seen by neurology inpatient with recommendations to resume Xarelto.     She presents today to discussed Watchman. She is feeling well overall. Two weeks ago had a brief episode of rapid heart rate associated with shortness of breath. None since that time. Denies chest pain.     Cardiac PMH: (Old records have been reviewed and summarized below)  TTE (10/16/23): EF60%, LA/RA moderately dilated, mod MR, mod TR, RVSP 44 mmHg    Monitor (12/5/23):   6 day 22 hour ambulatory ECG:  -Basic underlying rhythm is AF ( 100% burden) with and average HR of 72 bpm ( ).  -3 brief pauses occurred and ranged between 3.0 -3.3 seconds.  -Occasional PVC's ( 1.4%)    Limited TTE (2/22/25): EF 60%    Device (2/24/25): Successful implantation of [employer] biventricular permanent pacemaker     TTE (5/6/25) [facility]: EF 50-54%, LA severely dilated, RA moderately dilated    Reviewed office note Deleon  NP 6/5/25    Past Medical History, Past Surgical History, Family history, Social History, and Medications were all reviewed with the patient today and updated

## 2025-08-01 ENCOUNTER — TELEPHONE (OUTPATIENT)
Age: 80
End: 2025-08-01

## 2025-08-01 ENCOUNTER — HOSPITAL ENCOUNTER (OUTPATIENT)
Dept: LAB | Age: 80
Setting detail: SPECIMEN
Discharge: HOME | End: 2025-08-03

## 2025-08-01 ENCOUNTER — HOSPITAL ENCOUNTER (OUTPATIENT)
Dept: LAB | Age: 80
Discharge: HOME | End: 2025-08-04

## 2025-08-01 DIAGNOSIS — I48.21 PERMANENT ATRIAL FIBRILLATION (HCC): ICD-10-CM

## 2025-08-03 RX ORDER — HYDRALAZINE HYDROCHLORIDE 20 MG/ML
10 INJECTION INTRAMUSCULAR; INTRAVENOUS
Status: CANCELLED | OUTPATIENT
Start: 2025-08-03

## 2025-08-03 RX ORDER — LIDOCAINE HYDROCHLORIDE 10 MG/ML
1 INJECTION, SOLUTION INFILTRATION; PERINEURAL
Status: CANCELLED | OUTPATIENT
Start: 2025-08-03

## 2025-08-03 RX ORDER — ACETAMINOPHEN 500 MG
1000 TABLET ORAL ONCE
Status: CANCELLED | OUTPATIENT
Start: 2025-08-03 | End: 2025-08-03

## 2025-08-03 RX ORDER — LABETALOL HYDROCHLORIDE 5 MG/ML
10 INJECTION, SOLUTION INTRAVENOUS
Status: CANCELLED | OUTPATIENT
Start: 2025-08-03

## 2025-08-03 RX ORDER — DIPHENHYDRAMINE HYDROCHLORIDE 50 MG/ML
12.5 INJECTION, SOLUTION INTRAMUSCULAR; INTRAVENOUS
Status: CANCELLED | OUTPATIENT
Start: 2025-08-03

## 2025-08-03 RX ORDER — SODIUM CHLORIDE 0.9 % (FLUSH) 0.9 %
5-40 SYRINGE (ML) INJECTION EVERY 12 HOURS SCHEDULED
Status: CANCELLED | OUTPATIENT
Start: 2025-08-03

## 2025-08-03 RX ORDER — SODIUM CHLORIDE 0.9 % (FLUSH) 0.9 %
5-40 SYRINGE (ML) INJECTION PRN
Status: CANCELLED | OUTPATIENT
Start: 2025-08-03

## 2025-08-03 RX ORDER — IPRATROPIUM BROMIDE AND ALBUTEROL SULFATE 2.5; .5 MG/3ML; MG/3ML
1 SOLUTION RESPIRATORY (INHALATION)
Status: CANCELLED | OUTPATIENT
Start: 2025-08-03

## 2025-08-03 RX ORDER — SODIUM CHLORIDE 9 MG/ML
INJECTION, SOLUTION INTRAVENOUS PRN
Status: CANCELLED | OUTPATIENT
Start: 2025-08-03

## 2025-08-03 RX ORDER — SODIUM CHLORIDE, SODIUM LACTATE, POTASSIUM CHLORIDE, CALCIUM CHLORIDE 600; 310; 30; 20 MG/100ML; MG/100ML; MG/100ML; MG/100ML
INJECTION, SOLUTION INTRAVENOUS CONTINUOUS
Status: CANCELLED | OUTPATIENT
Start: 2025-08-03

## 2025-08-03 RX ORDER — HALOPERIDOL 5 MG/ML
1 INJECTION INTRAMUSCULAR
Status: CANCELLED | OUTPATIENT
Start: 2025-08-03

## 2025-08-04 ENCOUNTER — APPOINTMENT (OUTPATIENT)
Dept: CARDIAC CATH/INVASIVE PROCEDURES | Age: 80
DRG: 274 | End: 2025-08-04
Attending: INTERNAL MEDICINE
Payer: MEDICARE

## 2025-08-04 ENCOUNTER — ANESTHESIA EVENT (OUTPATIENT)
Dept: CARDIAC CATH/INVASIVE PROCEDURES | Age: 80
DRG: 274 | End: 2025-08-04
Payer: MEDICARE

## 2025-08-04 ENCOUNTER — HOSPITAL ENCOUNTER (INPATIENT)
Age: 80
LOS: 1 days | Discharge: HOME | DRG: 274 | End: 2025-08-05
Attending: INTERNAL MEDICINE | Admitting: INTERNAL MEDICINE
Payer: MEDICARE

## 2025-08-04 ENCOUNTER — ANESTHESIA (OUTPATIENT)
Dept: CARDIAC CATH/INVASIVE PROCEDURES | Age: 80
DRG: 274 | End: 2025-08-04
Payer: MEDICARE

## 2025-08-04 DIAGNOSIS — I48.21 PERMANENT ATRIAL FIBRILLATION (HCC): Primary | ICD-10-CM

## 2025-08-04 LAB
ABO + RH BLD: NORMAL
ACT BLD: 250 SECS (ref 70–128)
ALBUMIN SERPL-MCNC: 3.7 G/DL (ref 3.2–4.6)
ANION GAP SERPL CALC-SCNC: 11 MMOL/L (ref 7–16)
BLOOD GROUP ANTIBODIES SERPL: NORMAL
BUN SERPL-MCNC: 12 MG/DL (ref 8–23)
CALCIUM SERPL-MCNC: 9.4 MG/DL (ref 8.8–10.2)
CHLORIDE SERPL-SCNC: 102 MMOL/L (ref 98–107)
CO2 SERPL-SCNC: 26 MMOL/L (ref 20–29)
CREAT SERPL-MCNC: 0.67 MG/DL (ref 0.6–1.1)
ECHO BSA: 1.85 M2
EKG ATRIAL RATE: 79 BPM
EKG DIAGNOSIS: NORMAL
EKG Q-T INTERVAL: 444 MS
EKG QRS DURATION: 144 MS
EKG QTC CALCULATION (BAZETT): 482 MS
EKG R AXIS: -39 DEGREES
EKG T AXIS: 60 DEGREES
EKG VENTRICULAR RATE: 71 BPM
ERYTHROCYTE [DISTWIDTH] IN BLOOD BY AUTOMATED COUNT: 15.2 % (ref 11.9–14.6)
GLUCOSE SERPL-MCNC: 85 MG/DL (ref 70–99)
HCT VFR BLD AUTO: 37.8 % (ref 35.8–46.3)
HGB BLD-MCNC: 12.1 G/DL (ref 11.7–15.4)
INR PPP: 1.1
MAGNESIUM SERPL-MCNC: 2.1 MG/DL (ref 1.8–2.4)
MCH RBC QN AUTO: 30.3 PG (ref 26.1–32.9)
MCHC RBC AUTO-ENTMCNC: 32 G/DL (ref 31.4–35)
MCV RBC AUTO: 94.5 FL (ref 82–102)
NRBC # BLD: 0 K/UL (ref 0–0.2)
PLATELET # BLD AUTO: 254 K/UL (ref 150–450)
PMV BLD AUTO: 11 FL (ref 9.4–12.3)
POTASSIUM SERPL-SCNC: 4.3 MMOL/L (ref 3.5–5.1)
PROTHROMBIN TIME: 13.9 SEC (ref 11.3–14.9)
RBC # BLD AUTO: 4 M/UL (ref 4.05–5.2)
SODIUM SERPL-SCNC: 139 MMOL/L (ref 136–145)
SPECIMEN EXP DATE BLD: NORMAL
WBC # BLD AUTO: 7.5 K/UL (ref 4.3–11.1)

## 2025-08-04 PROCEDURE — 93662 INTRACARDIAC ECG (ICE): CPT | Performed by: INTERNAL MEDICINE

## 2025-08-04 PROCEDURE — B24BZZ4 ULTRASONOGRAPHY OF HEART WITH AORTA, TRANSESOPHAGEAL: ICD-10-PCS | Performed by: INTERNAL MEDICINE

## 2025-08-04 PROCEDURE — 2500000003 HC RX 250 WO HCPCS: Performed by: NURSE PRACTITIONER

## 2025-08-04 PROCEDURE — 86850 RBC ANTIBODY SCREEN: CPT

## 2025-08-04 PROCEDURE — C1894 INTRO/SHEATH, NON-LASER: HCPCS | Performed by: INTERNAL MEDICINE

## 2025-08-04 PROCEDURE — C1759 CATH, INTRA ECHOCARDIOGRAPHY: HCPCS | Performed by: INTERNAL MEDICINE

## 2025-08-04 PROCEDURE — 6360000002 HC RX W HCPCS: Performed by: INTERNAL MEDICINE

## 2025-08-04 PROCEDURE — 86900 BLOOD TYPING SEROLOGIC ABO: CPT

## 2025-08-04 PROCEDURE — 3700000001 HC ADD 15 MINUTES (ANESTHESIA): Performed by: INTERNAL MEDICINE

## 2025-08-04 PROCEDURE — 2500000003 HC RX 250 WO HCPCS

## 2025-08-04 PROCEDURE — 2500000003 HC RX 250 WO HCPCS: Performed by: INTERNAL MEDICINE

## 2025-08-04 PROCEDURE — C1889 IMPLANT/INSERT DEVICE, NOC: HCPCS | Performed by: INTERNAL MEDICINE

## 2025-08-04 PROCEDURE — 6370000000 HC RX 637 (ALT 250 FOR IP): Performed by: NURSE PRACTITIONER

## 2025-08-04 PROCEDURE — 2720000010 HC SURG SUPPLY STERILE: Performed by: INTERNAL MEDICINE

## 2025-08-04 PROCEDURE — 93010 ELECTROCARDIOGRAM REPORT: CPT | Performed by: INTERNAL MEDICINE

## 2025-08-04 PROCEDURE — 82040 ASSAY OF SERUM ALBUMIN: CPT

## 2025-08-04 PROCEDURE — 33340 PERQ CLSR TCAT L ATR APNDGE: CPT | Performed by: INTERNAL MEDICINE

## 2025-08-04 PROCEDURE — C1760 CLOSURE DEV, VASC: HCPCS | Performed by: INTERNAL MEDICINE

## 2025-08-04 PROCEDURE — 2709999900 HC NON-CHARGEABLE SUPPLY: Performed by: INTERNAL MEDICINE

## 2025-08-04 PROCEDURE — 85347 COAGULATION TIME ACTIVATED: CPT

## 2025-08-04 PROCEDURE — 85610 PROTHROMBIN TIME: CPT

## 2025-08-04 PROCEDURE — 02L73DK OCCLUSION OF LEFT ATRIAL APPENDAGE WITH INTRALUMINAL DEVICE, PERCUTANEOUS APPROACH: ICD-10-PCS | Performed by: INTERNAL MEDICINE

## 2025-08-04 PROCEDURE — 2140000000 HC CCU INTERMEDIATE R&B

## 2025-08-04 PROCEDURE — 86901 BLOOD TYPING SEROLOGIC RH(D): CPT

## 2025-08-04 PROCEDURE — 80048 BASIC METABOLIC PNL TOTAL CA: CPT

## 2025-08-04 PROCEDURE — 93005 ELECTROCARDIOGRAM TRACING: CPT | Performed by: INTERNAL MEDICINE

## 2025-08-04 PROCEDURE — 2580000003 HC RX 258

## 2025-08-04 PROCEDURE — 3700000000 HC ANESTHESIA ATTENDED CARE: Performed by: INTERNAL MEDICINE

## 2025-08-04 PROCEDURE — 6360000002 HC RX W HCPCS

## 2025-08-04 PROCEDURE — 83735 ASSAY OF MAGNESIUM: CPT

## 2025-08-04 PROCEDURE — 7100000000 HC PACU RECOVERY - FIRST 15 MIN: Performed by: INTERNAL MEDICINE

## 2025-08-04 PROCEDURE — 93325 DOPPLER ECHO COLOR FLOW MAPG: CPT

## 2025-08-04 PROCEDURE — 85027 COMPLETE CBC AUTOMATED: CPT

## 2025-08-04 DEVICE — IMPLANTABLE DEVICE: Type: IMPLANTABLE DEVICE | Status: FUNCTIONAL

## 2025-08-04 RX ORDER — LIDOCAINE HYDROCHLORIDE 20 MG/ML
INJECTION, SOLUTION EPIDURAL; INFILTRATION; INTRACAUDAL; PERINEURAL
Status: DISCONTINUED | OUTPATIENT
Start: 2025-08-04 | End: 2025-08-04 | Stop reason: SDUPTHER

## 2025-08-04 RX ORDER — SODIUM CHLORIDE 0.9 % (FLUSH) 0.9 %
5-40 SYRINGE (ML) INJECTION PRN
Status: DISCONTINUED | OUTPATIENT
Start: 2025-08-04 | End: 2025-08-05 | Stop reason: HOSPADM

## 2025-08-04 RX ORDER — ACETAMINOPHEN 500 MG
500 TABLET ORAL EVERY 6 HOURS PRN
Status: DISCONTINUED | OUTPATIENT
Start: 2025-08-04 | End: 2025-08-04 | Stop reason: SDUPTHER

## 2025-08-04 RX ORDER — DEXAMETHASONE SODIUM PHOSPHATE 4 MG/ML
INJECTION, SOLUTION INTRA-ARTICULAR; INTRALESIONAL; INTRAMUSCULAR; INTRAVENOUS; SOFT TISSUE
Status: DISCONTINUED | OUTPATIENT
Start: 2025-08-04 | End: 2025-08-04 | Stop reason: SDUPTHER

## 2025-08-04 RX ORDER — ROCURONIUM BROMIDE 10 MG/ML
INJECTION, SOLUTION INTRAVENOUS
Status: DISCONTINUED | OUTPATIENT
Start: 2025-08-04 | End: 2025-08-04 | Stop reason: SDUPTHER

## 2025-08-04 RX ORDER — POLYETHYLENE GLYCOL 3350 17 G/17G
17 POWDER, FOR SOLUTION ORAL DAILY PRN
Status: DISCONTINUED | OUTPATIENT
Start: 2025-08-04 | End: 2025-08-05 | Stop reason: HOSPADM

## 2025-08-04 RX ORDER — SODIUM CHLORIDE 9 MG/ML
INJECTION, SOLUTION INTRAVENOUS PRN
Status: DISCONTINUED | OUTPATIENT
Start: 2025-08-04 | End: 2025-08-05 | Stop reason: HOSPADM

## 2025-08-04 RX ORDER — ONDANSETRON 4 MG/1
4 TABLET, ORALLY DISINTEGRATING ORAL EVERY 8 HOURS PRN
Status: DISCONTINUED | OUTPATIENT
Start: 2025-08-04 | End: 2025-08-05 | Stop reason: HOSPADM

## 2025-08-04 RX ORDER — DONEPEZIL HYDROCHLORIDE 5 MG/1
10 TABLET, FILM COATED ORAL NIGHTLY
Status: DISCONTINUED | OUTPATIENT
Start: 2025-08-04 | End: 2025-08-05 | Stop reason: HOSPADM

## 2025-08-04 RX ORDER — CARBOXYMETHYLCELLULOSE SODIUM 10 MG/ML
1 GEL OPHTHALMIC PRN
Status: DISCONTINUED | OUTPATIENT
Start: 2025-08-04 | End: 2025-08-05 | Stop reason: HOSPADM

## 2025-08-04 RX ORDER — ONDANSETRON 2 MG/ML
4 INJECTION INTRAMUSCULAR; INTRAVENOUS EVERY 6 HOURS PRN
Status: DISCONTINUED | OUTPATIENT
Start: 2025-08-04 | End: 2025-08-05 | Stop reason: HOSPADM

## 2025-08-04 RX ORDER — PANTOPRAZOLE SODIUM 40 MG/1
40 TABLET, DELAYED RELEASE ORAL
Status: DISCONTINUED | OUTPATIENT
Start: 2025-08-04 | End: 2025-08-05 | Stop reason: HOSPADM

## 2025-08-04 RX ORDER — TRAMADOL HYDROCHLORIDE 50 MG/1
50 TABLET ORAL
Status: DISCONTINUED | OUTPATIENT
Start: 2025-08-04 | End: 2025-08-05 | Stop reason: HOSPADM

## 2025-08-04 RX ORDER — ROSUVASTATIN CALCIUM 20 MG/1
20 TABLET, COATED ORAL NIGHTLY
Status: DISCONTINUED | OUTPATIENT
Start: 2025-08-04 | End: 2025-08-05 | Stop reason: HOSPADM

## 2025-08-04 RX ORDER — MAGNESIUM SULFATE IN WATER 40 MG/ML
2000 INJECTION, SOLUTION INTRAVENOUS PRN
Status: DISCONTINUED | OUTPATIENT
Start: 2025-08-04 | End: 2025-08-05 | Stop reason: HOSPADM

## 2025-08-04 RX ORDER — NITROGLYCERIN 0.4 MG/1
0.4 TABLET SUBLINGUAL PRN
Status: DISCONTINUED | OUTPATIENT
Start: 2025-08-04 | End: 2025-08-05 | Stop reason: HOSPADM

## 2025-08-04 RX ORDER — ESCITALOPRAM OXALATE 10 MG/1
10 TABLET ORAL DAILY
Status: DISCONTINUED | OUTPATIENT
Start: 2025-08-05 | End: 2025-08-05 | Stop reason: HOSPADM

## 2025-08-04 RX ORDER — TRAZODONE HYDROCHLORIDE 50 MG/1
100 TABLET ORAL NIGHTLY
Status: DISCONTINUED | OUTPATIENT
Start: 2025-08-04 | End: 2025-08-05 | Stop reason: HOSPADM

## 2025-08-04 RX ORDER — SODIUM CHLORIDE 0.9 % (FLUSH) 0.9 %
5-40 SYRINGE (ML) INJECTION EVERY 12 HOURS SCHEDULED
Status: DISCONTINUED | OUTPATIENT
Start: 2025-08-04 | End: 2025-08-05 | Stop reason: HOSPADM

## 2025-08-04 RX ORDER — SODIUM CHLORIDE, SODIUM LACTATE, POTASSIUM CHLORIDE, CALCIUM CHLORIDE 600; 310; 30; 20 MG/100ML; MG/100ML; MG/100ML; MG/100ML
INJECTION, SOLUTION INTRAVENOUS
Status: DISCONTINUED | OUTPATIENT
Start: 2025-08-04 | End: 2025-08-04 | Stop reason: SDUPTHER

## 2025-08-04 RX ORDER — DIVALPROEX SODIUM 250 MG/1
250 TABLET, FILM COATED, EXTENDED RELEASE ORAL DAILY
Status: DISCONTINUED | OUTPATIENT
Start: 2025-08-05 | End: 2025-08-05 | Stop reason: HOSPADM

## 2025-08-04 RX ORDER — POTASSIUM CHLORIDE 7.45 MG/ML
10 INJECTION INTRAVENOUS PRN
Status: DISCONTINUED | OUTPATIENT
Start: 2025-08-04 | End: 2025-08-05 | Stop reason: HOSPADM

## 2025-08-04 RX ORDER — PROPOFOL 10 MG/ML
INJECTION, EMULSION INTRAVENOUS
Status: DISCONTINUED | OUTPATIENT
Start: 2025-08-04 | End: 2025-08-04 | Stop reason: SDUPTHER

## 2025-08-04 RX ORDER — POTASSIUM CHLORIDE 1500 MG/1
40 TABLET, EXTENDED RELEASE ORAL PRN
Status: DISCONTINUED | OUTPATIENT
Start: 2025-08-04 | End: 2025-08-05 | Stop reason: HOSPADM

## 2025-08-04 RX ORDER — LEVOTHYROXINE SODIUM 88 UG/1
88 TABLET ORAL
Status: DISCONTINUED | OUTPATIENT
Start: 2025-08-05 | End: 2025-08-05 | Stop reason: HOSPADM

## 2025-08-04 RX ORDER — ACETAMINOPHEN 650 MG/1
650 SUPPOSITORY RECTAL EVERY 6 HOURS PRN
Status: DISCONTINUED | OUTPATIENT
Start: 2025-08-04 | End: 2025-08-05 | Stop reason: HOSPADM

## 2025-08-04 RX ORDER — ACETAMINOPHEN 325 MG/1
650 TABLET ORAL EVERY 6 HOURS PRN
Status: DISCONTINUED | OUTPATIENT
Start: 2025-08-04 | End: 2025-08-05 | Stop reason: HOSPADM

## 2025-08-04 RX ORDER — HEPARIN SODIUM 1000 [USP'U]/ML
INJECTION, SOLUTION INTRAVENOUS; SUBCUTANEOUS
Status: DISCONTINUED | OUTPATIENT
Start: 2025-08-04 | End: 2025-08-04 | Stop reason: SDUPTHER

## 2025-08-04 RX ORDER — ONDANSETRON 2 MG/ML
INJECTION INTRAMUSCULAR; INTRAVENOUS
Status: DISCONTINUED | OUTPATIENT
Start: 2025-08-04 | End: 2025-08-04 | Stop reason: SDUPTHER

## 2025-08-04 RX ADMIN — SUGAMMADEX 200 MG: 100 INJECTION, SOLUTION INTRAVENOUS at 15:10

## 2025-08-04 RX ADMIN — ROCURONIUM BROMIDE 30 MG: 10 INJECTION, SOLUTION INTRAVENOUS at 14:10

## 2025-08-04 RX ADMIN — ROCURONIUM BROMIDE 10 MG: 10 INJECTION, SOLUTION INTRAVENOUS at 14:49

## 2025-08-04 RX ADMIN — PROPOFOL 120 MG: 10 INJECTION, EMULSION INTRAVENOUS at 14:09

## 2025-08-04 RX ADMIN — PHENYLEPHRINE HYDROCHLORIDE 100 MCG: 0.1 INJECTION, SOLUTION INTRAVENOUS at 14:35

## 2025-08-04 RX ADMIN — PHENYLEPHRINE HYDROCHLORIDE 50 MCG: 0.1 INJECTION, SOLUTION INTRAVENOUS at 15:00

## 2025-08-04 RX ADMIN — SODIUM CHLORIDE, SODIUM LACTATE, POTASSIUM CHLORIDE, AND CALCIUM CHLORIDE: 600; 310; 30; 20 INJECTION, SOLUTION INTRAVENOUS at 13:57

## 2025-08-04 RX ADMIN — PROPOFOL 30 MG: 10 INJECTION, EMULSION INTRAVENOUS at 14:18

## 2025-08-04 RX ADMIN — LIDOCAINE HYDROCHLORIDE 100 MG: 20 INJECTION, SOLUTION EPIDURAL; INFILTRATION; INTRACAUDAL; PERINEURAL at 14:09

## 2025-08-04 RX ADMIN — ROSUVASTATIN CALCIUM 20 MG: 20 TABLET, FILM COATED ORAL at 20:08

## 2025-08-04 RX ADMIN — DEXAMETHASONE SODIUM PHOSPHATE 4 MG: 4 INJECTION INTRA-ARTICULAR; INTRALESIONAL; INTRAMUSCULAR; INTRAVENOUS; SOFT TISSUE at 14:13

## 2025-08-04 RX ADMIN — DONEPEZIL HYDROCHLORIDE 10 MG: 5 TABLET, FILM COATED ORAL at 20:08

## 2025-08-04 RX ADMIN — SODIUM CHLORIDE, PRESERVATIVE FREE 10 ML: 5 INJECTION INTRAVENOUS at 20:08

## 2025-08-04 RX ADMIN — HEPARIN SODIUM 13000 UNITS: 1000 INJECTION INTRAVENOUS; SUBCUTANEOUS at 14:30

## 2025-08-04 RX ADMIN — ROCURONIUM BROMIDE 10 MG: 10 INJECTION, SOLUTION INTRAVENOUS at 14:32

## 2025-08-04 RX ADMIN — ACETAMINOPHEN 650 MG: 325 TABLET ORAL at 20:08

## 2025-08-04 RX ADMIN — TRAZODONE HYDROCHLORIDE 100 MG: 50 TABLET ORAL at 20:08

## 2025-08-04 RX ADMIN — Medication 2000 MG: at 20:08

## 2025-08-04 RX ADMIN — Medication 2000 MG: at 14:13

## 2025-08-04 RX ADMIN — ONDANSETRON 4 MG: 2 INJECTION, SOLUTION INTRAMUSCULAR; INTRAVENOUS at 14:13

## 2025-08-04 ASSESSMENT — ENCOUNTER SYMPTOMS: SHORTNESS OF BREATH: 1

## 2025-08-05 VITALS
TEMPERATURE: 97.5 F | DIASTOLIC BLOOD PRESSURE: 59 MMHG | SYSTOLIC BLOOD PRESSURE: 95 MMHG | RESPIRATION RATE: 18 BRPM | BODY MASS INDEX: 31.88 KG/M2 | WEIGHT: 179.9 LBS | HEART RATE: 70 BPM | OXYGEN SATURATION: 96 % | HEIGHT: 63 IN

## 2025-08-05 LAB
ANION GAP SERPL CALC-SCNC: 9 MMOL/L (ref 7–16)
BASOPHILS # BLD: 0.03 K/UL (ref 0–0.2)
BASOPHILS NFR BLD: 0.4 % (ref 0–2)
BUN SERPL-MCNC: 14 MG/DL (ref 8–23)
CALCIUM SERPL-MCNC: 8.5 MG/DL (ref 8.8–10.2)
CHLORIDE SERPL-SCNC: 106 MMOL/L (ref 98–107)
CO2 SERPL-SCNC: 24 MMOL/L (ref 20–29)
CREAT SERPL-MCNC: 0.66 MG/DL (ref 0.6–1.1)
DIFFERENTIAL METHOD BLD: ABNORMAL
ECHO BSA: 1.85 M2
EOSINOPHIL # BLD: 0.01 K/UL (ref 0–0.8)
EOSINOPHIL NFR BLD: 0.1 % (ref 0.5–7.8)
ERYTHROCYTE [DISTWIDTH] IN BLOOD BY AUTOMATED COUNT: 15 % (ref 11.9–14.6)
GLUCOSE SERPL-MCNC: 119 MG/DL (ref 70–99)
HCT VFR BLD AUTO: 30.4 % (ref 35.8–46.3)
HGB BLD-MCNC: 9.8 G/DL (ref 11.7–15.4)
IMM GRANULOCYTES # BLD AUTO: 0.02 K/UL (ref 0–0.5)
IMM GRANULOCYTES NFR BLD AUTO: 0.3 % (ref 0–5)
LYMPHOCYTES # BLD: 1 K/UL (ref 0.5–4.6)
LYMPHOCYTES NFR BLD: 13.3 % (ref 13–44)
MCH RBC QN AUTO: 30 PG (ref 26.1–32.9)
MCHC RBC AUTO-ENTMCNC: 32.2 G/DL (ref 31.4–35)
MCV RBC AUTO: 93 FL (ref 82–102)
MONOCYTES # BLD: 0.59 K/UL (ref 0.1–1.3)
MONOCYTES NFR BLD: 7.8 % (ref 4–12)
NEUTS SEG # BLD: 5.88 K/UL (ref 1.7–8.2)
NEUTS SEG NFR BLD: 78.1 % (ref 43–78)
NRBC # BLD: 0 K/UL (ref 0–0.2)
PLATELET # BLD AUTO: 203 K/UL (ref 150–450)
PMV BLD AUTO: 10.9 FL (ref 9.4–12.3)
POTASSIUM SERPL-SCNC: 4.1 MMOL/L (ref 3.5–5.1)
RBC # BLD AUTO: 3.27 M/UL (ref 4.05–5.2)
SODIUM SERPL-SCNC: 139 MMOL/L (ref 136–145)
WBC # BLD AUTO: 7.5 K/UL (ref 4.3–11.1)

## 2025-08-05 PROCEDURE — 93319 3D ECHO IMG CGEN CAR ANOMAL: CPT | Performed by: INTERNAL MEDICINE

## 2025-08-05 PROCEDURE — 80048 BASIC METABOLIC PNL TOTAL CA: CPT

## 2025-08-05 PROCEDURE — 6370000000 HC RX 637 (ALT 250 FOR IP): Performed by: NURSE PRACTITIONER

## 2025-08-05 PROCEDURE — 93312 ECHO TRANSESOPHAGEAL: CPT | Performed by: INTERNAL MEDICINE

## 2025-08-05 PROCEDURE — 99238 HOSP IP/OBS DSCHRG MGMT 30/<: CPT | Performed by: INTERNAL MEDICINE

## 2025-08-05 PROCEDURE — 2500000003 HC RX 250 WO HCPCS: Performed by: INTERNAL MEDICINE

## 2025-08-05 PROCEDURE — 85025 COMPLETE CBC W/AUTO DIFF WBC: CPT

## 2025-08-05 PROCEDURE — 6360000002 HC RX W HCPCS: Performed by: INTERNAL MEDICINE

## 2025-08-05 PROCEDURE — 36415 COLL VENOUS BLD VENIPUNCTURE: CPT

## 2025-08-05 RX ADMIN — LEVOTHYROXINE SODIUM 88 MCG: 0.09 TABLET ORAL at 05:59

## 2025-08-05 RX ADMIN — PANTOPRAZOLE SODIUM 40 MG: 40 TABLET, DELAYED RELEASE ORAL at 05:59

## 2025-08-05 RX ADMIN — Medication 2000 MG: at 04:45

## 2025-09-03 ENCOUNTER — OFFICE VISIT (OUTPATIENT)
Age: 80
End: 2025-09-03
Payer: MEDICARE

## 2025-09-03 VITALS
DIASTOLIC BLOOD PRESSURE: 64 MMHG | WEIGHT: 173 LBS | HEART RATE: 71 BPM | HEIGHT: 63 IN | BODY MASS INDEX: 30.65 KG/M2 | SYSTOLIC BLOOD PRESSURE: 118 MMHG

## 2025-09-03 DIAGNOSIS — I48.21 PERMANENT ATRIAL FIBRILLATION (HCC): Primary | ICD-10-CM

## 2025-09-03 PROCEDURE — 99214 OFFICE O/P EST MOD 30 MIN: CPT | Performed by: PHYSICIAN ASSISTANT

## 2025-09-03 PROCEDURE — G8427 DOCREV CUR MEDS BY ELIG CLIN: HCPCS | Performed by: PHYSICIAN ASSISTANT

## 2025-09-03 PROCEDURE — 1160F RVW MEDS BY RX/DR IN RCRD: CPT | Performed by: PHYSICIAN ASSISTANT

## 2025-09-03 PROCEDURE — 1123F ACP DISCUSS/DSCN MKR DOCD: CPT | Performed by: PHYSICIAN ASSISTANT

## 2025-09-03 PROCEDURE — 1036F TOBACCO NON-USER: CPT | Performed by: PHYSICIAN ASSISTANT

## 2025-09-03 PROCEDURE — 3074F SYST BP LT 130 MM HG: CPT | Performed by: PHYSICIAN ASSISTANT

## 2025-09-03 PROCEDURE — 3078F DIAST BP <80 MM HG: CPT | Performed by: PHYSICIAN ASSISTANT

## 2025-09-03 PROCEDURE — 93000 ELECTROCARDIOGRAM COMPLETE: CPT | Performed by: PHYSICIAN ASSISTANT

## 2025-09-03 PROCEDURE — G8400 PT W/DXA NO RESULTS DOC: HCPCS | Performed by: PHYSICIAN ASSISTANT

## 2025-09-03 PROCEDURE — G8417 CALC BMI ABV UP PARAM F/U: HCPCS | Performed by: PHYSICIAN ASSISTANT

## 2025-09-03 PROCEDURE — 1126F AMNT PAIN NOTED NONE PRSNT: CPT | Performed by: PHYSICIAN ASSISTANT

## 2025-09-03 PROCEDURE — 1111F DSCHRG MED/CURRENT MED MERGE: CPT | Performed by: PHYSICIAN ASSISTANT

## 2025-09-03 PROCEDURE — 1159F MED LIST DOCD IN RCRD: CPT | Performed by: PHYSICIAN ASSISTANT

## 2025-09-03 PROCEDURE — 1090F PRES/ABSN URINE INCON ASSESS: CPT | Performed by: PHYSICIAN ASSISTANT

## (undated) DEVICE — DEVICE VES SEAL OD 15 FR ID 10-12 FR PERC FEM VEN ACCS SITE

## (undated) DEVICE — CATHETER DIAG 6FR L110CM PIG CRV SZ 6 SIDE H DBL BRAID WIRE

## (undated) DEVICE — KIT ANGIO CNTRST ADMIN W O BWL WORLEY

## (undated) DEVICE — SYSTEM US 18GA NDL GUID KT PRB CVR LEN 96IN SITE RITE

## (undated) DEVICE — INTRODUCER SHTH 16FR L30CM ID5.3MM GWIRE 0.038IN HYDRPHLC

## (undated) DEVICE — SUTURE V-LOC 90 3-0 L9IN ABSRB VLT L26MM V-20 1/2 CIR TAPR VLOCM0644

## (undated) DEVICE — INTRODUCER SHTH 11FR CANN L11CM DIL TIP 45MM YEL TUNGSTEN

## (undated) DEVICE — GUIDE WIRE WITH HYDROPHILIC COATING: Brand: ACUITY WHISPER VIEW™

## (undated) DEVICE — RADIFOCUS GLIDEWIRE: Brand: GLIDEWIRE

## (undated) DEVICE — COVER US PRB L183CM DIA15.2-7.6CM CLR POLY TAPR ACCORDION

## (undated) DEVICE — SHEATH INTRO L10CM DIA8FR TIF TIP PINN

## (undated) DEVICE — SUTURE ETHIBOND EXCEL SZ 0 L30IN NONABSORBABLE GRN L26MM CT-2 X412H

## (undated) DEVICE — SUTURE ABSORBABLE BRAIDED 2-0 CT-1 27 IN UD VICRYL J259H

## (undated) DEVICE — 18G NG KIT WITH 96IN PROBE COVER (10 PK): Brand: SITE-RITE

## (undated) DEVICE — GUIDE COR SNUS L40CM DIA9FR 0.035IN STD CRV ADV UNIQUE

## (undated) DEVICE — PLASMABLADE X PS210-030S-LIGHT 3.0SL: Brand: PLASMABLADE™ X

## (undated) DEVICE — Device

## (undated) DEVICE — INTRODUCER LAT VEIN L62CM OD5.5FR ADV TELSCP SYS RENAL

## (undated) DEVICE — SHEATH INTRO 10FR L10CM DIL L2.5CM PERIPH W/ MINI S STL SPR

## (undated) DEVICE — SOLUTION IRRIG 1000ML H2O PIC PLAS SHATTERPROOF CONTAINER

## (undated) DEVICE — SYSTEM CLOSURE 6-12 FR VEN VASC VASCADE MVP

## (undated) DEVICE — INTRODUCER LD L13CM OD6FR SPLITTABLE DIL W/ J TIP GWIRE SYR

## (undated) DEVICE — CATHETER ULTRASOUND NAVIGATIONAL 10 FRX90 CM VIVID I ACUNAV

## (undated) DEVICE — BOWL MED M 16OZ PLAS CAP GRAD

## (undated) DEVICE — DRESSING POSTOP AG PRISMASEAL 3.5X6IN